# Patient Record
Sex: FEMALE
[De-identification: names, ages, dates, MRNs, and addresses within clinical notes are randomized per-mention and may not be internally consistent; named-entity substitution may affect disease eponyms.]

---

## 2021-01-16 ENCOUNTER — NURSE TRIAGE (OUTPATIENT)
Dept: OTHER | Facility: CLINIC | Age: 41
End: 2021-01-16

## 2021-01-17 NOTE — TELEPHONE ENCOUNTER
Reason for Disposition   Triager unable to answer question    Answer Assessment - Initial Assessment Questions  1. ONSET: \"When did it happen? \" If happened < 10 minutes ago, ask: \"Did you wash off the chemical?\" If not, give First Aid Advice immediately. Pt was cutting a hot pepper and the pepper oil  got on the hand and she has had burning for several hours. She has washed the area several times- used soap, alcohol, butter, vegetable oil, baking soda mixed with water- pt states this happened tonight around 8 pm      2. CHEMICAL: \"What is the name of the substance? \"  Hot pepper    3. LOCATION: \"Where is the burn located? \"      Left hand- between the pointer and index finger and the thumb    4. BURN SIZE: \"How large is the burn? \"  The palm is roughly 1% of the total body surface area (BSA). Not on the palm - just in between two fingers and the thumb    5. SEVERITY OF THE BURN: \"Is there redness? \", \"Are there any blisters? \"    Denies redness or blisters    6. MECHANISM: \"Tell me how it happened. \"     Cutting a hot pepper and some of the oils got on the skin    7. PAIN: [facility] Memory you having any pain? \" \"How bad is the pain? \" (Scale 1-10; or mild, moderate, severe)    - MILD (1-3): doesn't interfere with normal activities     - MODERATE (4-7): interferes with normal activities or awakens from sleep     - SEVERE (8-10): excruciating pain, unable to do any normal activities     3/10    8. OTHER SYMPTOMS: \"Do you have any other symptoms? \"  Just a burning sensation     9. PREGNANCY: \"Is there any chance you are pregnant? \" \"When was your last menstrual period? \"   Denies    Protocols used: BURNS - CHEMICAL-ADULT-AH    Brief description of triage: burning from hot pepper, between fingers and left thumb. See assessment. Triage indicates for patient to Call [facility]. Warm transfer to [address] [place] [address] pt to [facility]- care advise provided to pt per poison control to relieve the burning sensation. Care advice provided, patient verbalizes understanding; denies any other questions or concerns; instructed to call back for any new or worsening symptoms. This triage is a result of a call to [address] [place] [address]. Please do not respond to the triage nurse through this encounter. Any subsequent communication should be directly with the patient.

## 2022-10-15 ENCOUNTER — NURSE TRIAGE (OUTPATIENT)
Dept: OTHER | Facility: CLINIC | Age: 42
End: 2022-10-15

## 2022-10-15 NOTE — TELEPHONE ENCOUNTER
[place] caller. No triage. Reason for Disposition   Medication questions   [1] Caller has NON-URGENT medicine question about med that PCP prescribed AND [2] triager unable to answer question    Protocols used:  Information Only Call - No Triage-ADULT-, Medication Question Call-ADULT-

## 2023-01-27 PROBLEM — L70.0 SUPERFICIAL ACNE VULGARIS: Status: ACTIVE | Noted: 2023-01-27

## 2023-01-27 PROBLEM — J35.8 TONSILLITH: Status: ACTIVE | Noted: 2023-01-27

## 2023-01-27 PROBLEM — M54.2 NECK PAIN, ACUTE: Status: ACTIVE | Noted: 2023-01-27

## 2023-01-27 PROBLEM — H69.92 DYSFUNCTION OF LEFT EUSTACHIAN TUBE: Status: ACTIVE | Noted: 2023-01-27

## 2023-01-27 PROBLEM — S29.012A STRAIN OF THORACIC SPINE: Status: ACTIVE | Noted: 2023-01-27

## 2023-01-27 PROBLEM — M72.2 PLANTAR FASCIITIS: Status: ACTIVE | Noted: 2023-01-27

## 2023-01-27 PROBLEM — J01.00 ACUTE MAXILLARY SINUSITIS: Status: ACTIVE | Noted: 2023-01-27

## 2023-01-27 PROBLEM — J32.9 SINUSITIS: Status: ACTIVE | Noted: 2023-01-27

## 2023-01-27 PROBLEM — M79.673 FOOT PAIN: Status: ACTIVE | Noted: 2023-01-27

## 2023-01-27 PROBLEM — G89.29 CHRONIC FOOT PAIN: Status: ACTIVE | Noted: 2023-01-27

## 2023-01-27 PROBLEM — M84.376A STRESS FRACTURE OF FOOT: Status: ACTIVE | Noted: 2023-01-27

## 2023-01-27 PROBLEM — S16.1XXA CERVICAL STRAIN: Status: ACTIVE | Noted: 2023-01-27

## 2023-01-27 PROBLEM — J30.9 ALLERGIC RHINITIS: Status: ACTIVE | Noted: 2023-01-27

## 2023-01-27 PROBLEM — M21.619 ASYMPTOMATIC BUNION: Status: ACTIVE | Noted: 2023-01-27

## 2023-01-27 PROBLEM — R42 DIZZINESS: Status: ACTIVE | Noted: 2023-01-27

## 2023-01-27 PROBLEM — R10.32 ABDOMINAL PAIN, ACUTE, LEFT LOWER QUADRANT: Status: ACTIVE | Noted: 2023-01-27

## 2023-01-27 PROBLEM — M54.6 THORACOLUMBAR BACK PAIN: Status: ACTIVE | Noted: 2023-01-27

## 2023-01-27 PROBLEM — J02.9 PHARYNGITIS: Status: ACTIVE | Noted: 2023-01-27

## 2023-01-27 PROBLEM — M79.673 CHRONIC FOOT PAIN: Status: ACTIVE | Noted: 2023-01-27

## 2023-01-27 PROBLEM — S92.309A FRACTURE OF METATARSAL: Status: ACTIVE | Noted: 2023-01-27

## 2023-01-27 PROBLEM — M79.673 PAIN OF FOOT: Status: ACTIVE | Noted: 2023-01-27

## 2023-01-27 PROBLEM — E55.9 VITAMIN D DEFICIENCY: Status: ACTIVE | Noted: 2023-01-27

## 2023-01-27 PROBLEM — J06.9 VIRAL URI WITH COUGH: Status: ACTIVE | Noted: 2023-01-27

## 2023-01-27 PROBLEM — Z04.9 CONDITION NOT FOUND: Status: ACTIVE | Noted: 2023-01-27

## 2023-01-27 PROBLEM — S13.4XXA WHIPLASH INJURY TO NECK: Status: ACTIVE | Noted: 2023-01-27

## 2023-01-27 PROBLEM — F32.9 DEPRESSION, MAJOR: Status: ACTIVE | Noted: 2023-01-27

## 2023-01-27 PROBLEM — J02.9 SORE THROAT: Status: ACTIVE | Noted: 2023-01-27

## 2023-01-27 PROBLEM — M54.50 THORACOLUMBAR BACK PAIN: Status: ACTIVE | Noted: 2023-01-27

## 2023-01-27 PROBLEM — R93.89 ABNORMAL ULTRASOUND OF PELVIS: Status: ACTIVE | Noted: 2023-01-27

## 2023-01-27 PROBLEM — R35.0 INCREASED URINARY FREQUENCY: Status: ACTIVE | Noted: 2023-01-27

## 2023-01-27 PROBLEM — R53.83 FATIGUE: Status: ACTIVE | Noted: 2023-01-27

## 2023-01-27 RX ORDER — DAPSONE 50 MG/G
GEL TOPICAL
COMMUNITY
Start: 2015-03-02 | End: 2023-10-25 | Stop reason: SDUPTHER

## 2023-01-27 RX ORDER — FOLIC ACID/MULTIVIT,IRON,MINER 0.4MG-18MG
1 TABLET ORAL DAILY
COMMUNITY
Start: 2021-02-15

## 2023-01-27 RX ORDER — LORATADINE 10 MG/1
TABLET ORAL
COMMUNITY
Start: 2019-07-27

## 2023-01-27 RX ORDER — MOMETASONE FUROATE 50 UG/1
SPRAY, METERED NASAL
COMMUNITY

## 2023-01-27 RX ORDER — ACETAMINOPHEN 500 MG
TABLET ORAL
COMMUNITY
Start: 2022-07-25 | End: 2023-03-10 | Stop reason: SDUPTHER

## 2023-01-27 RX ORDER — SERTRALINE HYDROCHLORIDE 100 MG/1
1 TABLET, FILM COATED ORAL DAILY
COMMUNITY
Start: 2018-07-30 | End: 2023-07-20

## 2023-03-08 ASSESSMENT — PROMIS GLOBAL HEALTH SCALE
RATE_PHYSICAL_HEALTH: VERY GOOD
RATE_SOCIAL_SATISFACTION: VERY GOOD
CARRYOUT_SOCIAL_ACTIVITIES: VERY GOOD
RATE_AVERAGE_PAIN: 1
RATE_AVERAGE_FATIGUE: MILD
CARRYOUT_PHYSICAL_ACTIVITIES: COMPLETELY
RATE_MENTAL_HEALTH: VERY GOOD
EMOTIONAL_PROBLEMS: RARELY
RATE_QUALITY_OF_LIFE: VERY GOOD
RATE_GENERAL_HEALTH: VERY GOOD

## 2023-03-10 ENCOUNTER — OFFICE VISIT (OUTPATIENT)
Dept: PRIMARY CARE | Facility: CLINIC | Age: 43
End: 2023-03-10
Payer: COMMERCIAL

## 2023-03-10 VITALS
RESPIRATION RATE: 16 BRPM | HEART RATE: 74 BPM | HEIGHT: 62 IN | WEIGHT: 130 LBS | DIASTOLIC BLOOD PRESSURE: 78 MMHG | TEMPERATURE: 97.9 F | BODY MASS INDEX: 23.92 KG/M2 | SYSTOLIC BLOOD PRESSURE: 122 MMHG

## 2023-03-10 DIAGNOSIS — Z00.00 ENCOUNTER FOR PREVENTIVE HEALTH EXAMINATION: ICD-10-CM

## 2023-03-10 DIAGNOSIS — E55.9 VITAMIN D DEFICIENCY: Primary | ICD-10-CM

## 2023-03-10 PROBLEM — M54.2 NECK PAIN, ACUTE: Status: RESOLVED | Noted: 2023-01-27 | Resolved: 2023-03-10

## 2023-03-10 PROBLEM — H69.92 DYSFUNCTION OF LEFT EUSTACHIAN TUBE: Status: RESOLVED | Noted: 2023-01-27 | Resolved: 2023-03-10

## 2023-03-10 PROBLEM — M79.673 FOOT PAIN: Status: RESOLVED | Noted: 2023-01-27 | Resolved: 2023-03-10

## 2023-03-10 PROBLEM — J02.9 SORE THROAT: Status: RESOLVED | Noted: 2023-01-27 | Resolved: 2023-03-10

## 2023-03-10 PROBLEM — R42 DIZZINESS: Status: RESOLVED | Noted: 2023-01-27 | Resolved: 2023-03-10

## 2023-03-10 PROBLEM — R10.32 ABDOMINAL PAIN, ACUTE, LEFT LOWER QUADRANT: Status: RESOLVED | Noted: 2023-01-27 | Resolved: 2023-03-10

## 2023-03-10 PROBLEM — J02.9 PHARYNGITIS: Status: RESOLVED | Noted: 2023-01-27 | Resolved: 2023-03-10

## 2023-03-10 PROBLEM — M54.6 THORACOLUMBAR BACK PAIN: Status: RESOLVED | Noted: 2023-01-27 | Resolved: 2023-03-10

## 2023-03-10 PROBLEM — G89.29 CHRONIC FOOT PAIN: Status: RESOLVED | Noted: 2023-01-27 | Resolved: 2023-03-10

## 2023-03-10 PROBLEM — S13.4XXA WHIPLASH INJURY TO NECK: Status: RESOLVED | Noted: 2023-01-27 | Resolved: 2023-03-10

## 2023-03-10 PROBLEM — R53.83 FATIGUE: Status: RESOLVED | Noted: 2023-01-27 | Resolved: 2023-03-10

## 2023-03-10 PROBLEM — J35.8 TONSILLITH: Status: RESOLVED | Noted: 2023-01-27 | Resolved: 2023-03-10

## 2023-03-10 PROBLEM — S29.012A STRAIN OF THORACIC SPINE: Status: RESOLVED | Noted: 2023-01-27 | Resolved: 2023-03-10

## 2023-03-10 PROBLEM — J01.00 ACUTE MAXILLARY SINUSITIS: Status: RESOLVED | Noted: 2023-01-27 | Resolved: 2023-03-10

## 2023-03-10 PROBLEM — Z04.9 CONDITION NOT FOUND: Status: RESOLVED | Noted: 2023-01-27 | Resolved: 2023-03-10

## 2023-03-10 PROBLEM — S92.309A FRACTURE OF METATARSAL: Status: RESOLVED | Noted: 2023-01-27 | Resolved: 2023-03-10

## 2023-03-10 PROBLEM — J32.9 SINUSITIS: Status: RESOLVED | Noted: 2023-01-27 | Resolved: 2023-03-10

## 2023-03-10 PROBLEM — J06.9 VIRAL URI WITH COUGH: Status: RESOLVED | Noted: 2023-01-27 | Resolved: 2023-03-10

## 2023-03-10 PROBLEM — S16.1XXA CERVICAL STRAIN: Status: RESOLVED | Noted: 2023-01-27 | Resolved: 2023-03-10

## 2023-03-10 PROBLEM — M72.2 PLANTAR FASCIITIS: Status: RESOLVED | Noted: 2023-01-27 | Resolved: 2023-03-10

## 2023-03-10 PROBLEM — R35.0 INCREASED URINARY FREQUENCY: Status: RESOLVED | Noted: 2023-01-27 | Resolved: 2023-03-10

## 2023-03-10 PROBLEM — M54.50 THORACOLUMBAR BACK PAIN: Status: RESOLVED | Noted: 2023-01-27 | Resolved: 2023-03-10

## 2023-03-10 PROBLEM — M79.673 CHRONIC FOOT PAIN: Status: RESOLVED | Noted: 2023-01-27 | Resolved: 2023-03-10

## 2023-03-10 PROCEDURE — 99396 PREV VISIT EST AGE 40-64: CPT | Performed by: FAMILY MEDICINE

## 2023-03-10 PROCEDURE — 1036F TOBACCO NON-USER: CPT | Performed by: FAMILY MEDICINE

## 2023-03-10 RX ORDER — ACETAMINOPHEN 500 MG
5000 TABLET ORAL DAILY
Qty: 90 TABLET | Refills: 3 | Status: SHIPPED | OUTPATIENT
Start: 2023-03-10

## 2023-03-10 NOTE — PROGRESS NOTES
"Subjective   Patient ID: Neena Aviles is a 42 y.o. female who presents for Annual Exam.  Patient is here for a periodic health exam. We reviewed previous labs and medical records and history. I reviewed preventative health testing and immunizations.    She had a mammogram recently from her gynecologist.  She says she does not need Pap test anymore.  She has no complaints or concerns today and has been feeling well.        Objective   /78   Pulse 74   Temp 36.6 °C (97.9 °F)   Resp 16   Ht 1.575 m (5' 2\")   Wt 59 kg (130 lb)   BMI 23.78 kg/m²     Physical Exam  Vitals and nursing note reviewed.   Constitutional:       General: She is not in acute distress.     Appearance: Normal appearance.   HENT:      Head: Normocephalic and atraumatic.      Right Ear: Tympanic membrane, ear canal and external ear normal.      Left Ear: Tympanic membrane, ear canal and external ear normal.      Nose: Nose normal.      Mouth/Throat:      Mouth: Mucous membranes are moist.      Pharynx: Oropharynx is clear.   Eyes:      Extraocular Movements: Extraocular movements intact.      Conjunctiva/sclera: Conjunctivae normal.      Pupils: Pupils are equal, round, and reactive to light.   Cardiovascular:      Rate and Rhythm: Normal rate and regular rhythm.      Pulses: Normal pulses.      Heart sounds: Normal heart sounds. No murmur heard.     No friction rub. No gallop.   Pulmonary:      Effort: Pulmonary effort is normal. No respiratory distress.      Breath sounds: Normal breath sounds.   Abdominal:      General: Abdomen is flat. Bowel sounds are normal. There is no distension.      Palpations: Abdomen is soft.      Tenderness: There is no abdominal tenderness.   Musculoskeletal:         General: Normal range of motion.      Cervical back: Normal range of motion and neck supple.   Lymphadenopathy:      Cervical: No cervical adenopathy.   Skin:     General: Skin is warm and dry.      Findings: No lesion or rash. "   Neurological:      General: No focal deficit present.      Mental Status: She is alert. Mental status is at baseline.   Psychiatric:         Mood and Affect: Mood normal.         Behavior: Behavior normal.         Thought Content: Thought content normal.         Judgment: Judgment normal.         Assessment/Plan   Diagnoses and all orders for this visit:  Vitamin D deficiency  -     cholecalciferol (Vitamin D-3) 5,000 Units tablet; Take 1 tablet (5,000 Units) by mouth once daily.  Patient's recent lab was normal.  Encounter for preventive health examination   Recommend regular exercise, balanced diet, regular dental exams, and healthy habits.  Appropriate labs ordered or reviewed.  Her immunizations are up-to-date.  She had a mammogram recently.  I recommend a yearly flu shot in the fall and I recommend a yearly wellness exam.

## 2023-03-20 LAB
FLU A RESULT: NOT DETECTED
FLU B RESULT: NOT DETECTED
SARS-COV-2 RESULT: NOT DETECTED

## 2023-07-20 DIAGNOSIS — F33.9 EPISODE OF RECURRENT MAJOR DEPRESSIVE DISORDER, UNSPECIFIED DEPRESSION EPISODE SEVERITY (CMS-HCC): ICD-10-CM

## 2023-07-20 RX ORDER — SERTRALINE HYDROCHLORIDE 100 MG/1
100 TABLET, FILM COATED ORAL DAILY
Qty: 90 TABLET | Refills: 0 | Status: SHIPPED | OUTPATIENT
Start: 2023-07-20 | End: 2023-10-18 | Stop reason: SDUPTHER

## 2023-10-18 DIAGNOSIS — F33.9 EPISODE OF RECURRENT MAJOR DEPRESSIVE DISORDER, UNSPECIFIED DEPRESSION EPISODE SEVERITY (CMS-HCC): ICD-10-CM

## 2023-10-18 RX ORDER — SERTRALINE HYDROCHLORIDE 100 MG/1
100 TABLET, FILM COATED ORAL DAILY
Qty: 90 TABLET | Refills: 1 | OUTPATIENT
Start: 2023-10-18

## 2023-10-18 RX ORDER — SERTRALINE HYDROCHLORIDE 100 MG/1
100 TABLET, FILM COATED ORAL DAILY
Qty: 30 TABLET | Refills: 0 | Status: SHIPPED | OUTPATIENT
Start: 2023-10-18 | End: 2023-10-25 | Stop reason: SDUPTHER

## 2023-10-18 NOTE — TELEPHONE ENCOUNTER
Please call patient and schedule an appointment.  You can send in a 30-day prescription if needed so she does not run out.

## 2023-10-25 ENCOUNTER — OFFICE VISIT (OUTPATIENT)
Dept: PRIMARY CARE | Facility: CLINIC | Age: 43
End: 2023-10-25
Payer: COMMERCIAL

## 2023-10-25 VITALS
SYSTOLIC BLOOD PRESSURE: 114 MMHG | HEIGHT: 62 IN | WEIGHT: 128 LBS | DIASTOLIC BLOOD PRESSURE: 68 MMHG | TEMPERATURE: 97.1 F | RESPIRATION RATE: 16 BRPM | BODY MASS INDEX: 23.55 KG/M2 | HEART RATE: 62 BPM

## 2023-10-25 DIAGNOSIS — E55.9 VITAMIN D DEFICIENCY: ICD-10-CM

## 2023-10-25 DIAGNOSIS — F33.41 RECURRENT MAJOR DEPRESSIVE DISORDER, IN PARTIAL REMISSION (CMS-HCC): Primary | ICD-10-CM

## 2023-10-25 DIAGNOSIS — L70.0 SUPERFICIAL ACNE VULGARIS: ICD-10-CM

## 2023-10-25 PROBLEM — B34.9 VIRAL SYNDROME: Status: RESOLVED | Noted: 2023-10-25 | Resolved: 2023-10-25

## 2023-10-25 PROBLEM — H10.32 ACUTE CONJUNCTIVITIS, LEFT EYE: Status: ACTIVE | Noted: 2023-10-25

## 2023-10-25 PROBLEM — Z90.710 HISTORY OF HYSTERECTOMY: Status: ACTIVE | Noted: 2023-10-25

## 2023-10-25 PROBLEM — B34.9 VIRAL SYNDROME: Status: ACTIVE | Noted: 2023-10-25

## 2023-10-25 PROBLEM — H10.32 ACUTE CONJUNCTIVITIS, LEFT EYE: Status: RESOLVED | Noted: 2023-10-25 | Resolved: 2023-10-25

## 2023-10-25 PROCEDURE — 1036F TOBACCO NON-USER: CPT | Performed by: FAMILY MEDICINE

## 2023-10-25 PROCEDURE — 99214 OFFICE O/P EST MOD 30 MIN: CPT | Performed by: FAMILY MEDICINE

## 2023-10-25 RX ORDER — DAPSONE 50 MG/G
GEL TOPICAL
Qty: 90 G | Refills: 0 | Status: SHIPPED | OUTPATIENT
Start: 2023-10-25

## 2023-10-25 RX ORDER — SERTRALINE HYDROCHLORIDE 100 MG/1
100 TABLET, FILM COATED ORAL DAILY
Qty: 90 TABLET | Refills: 1 | Status: SHIPPED | OUTPATIENT
Start: 2023-10-25 | End: 2024-03-18 | Stop reason: SDUPTHER

## 2023-10-25 NOTE — PROGRESS NOTES
Neena Aviles is a 43 y.o. female here today for   Chief Complaint   Patient presents with    Depression    Vitamin D Deficiency        Mood disorder recheck -- Patient feels like condition is well controlled.  Has good control of mood and emotional reactions.  No SE's or problems with medications.  No homicidal or suicidal ideation.  Patient wishes to continue same medications.   Takes sertraline daily and sees a counselor monthly and seems very stable now.  Takes sertraline in AM.  Still with some sexual SE's with decreased libido and decreased ability to orgasm but not new and now bothersome.      Vitamin D deficiency -- Patient reports no muscle weakness or pain.  Taking Vitamin D as instructed.  Takes 5000 U daily.      Acne - Patient reports that this condition has been stable.  There has not been any worsening or change in symptoms.  Patient feels it is well controlled and would like to continue current therapies.         Current Outpatient Medications:     cholecalciferol (Vitamin D-3) 5,000 Units tablet, Take 1 tablet (5,000 Units) by mouth once daily., Disp: 90 tablet, Rfl: 3    loratadine (Claritin) 10 mg tablet, Take by mouth., Disp: , Rfl:     mometasone (Nasonex) 50 mcg/actuation nasal spray, Nasonex 50 MCG/ACT SUSP  Refills: 0     Active, Disp: , Rfl:     multivitamin-min-iron-FA-vit K (One Daily Women's) 18 mg iron-400 mcg-25 mcg tablet, Take 1 tablet by mouth once daily., Disp: , Rfl:     dapsone (Aczone) 5 % gel, Apply twice daily to face for acne, Disp: 90 g, Rfl: 0    sertraline (Zoloft) 100 mg tablet, Take 1 tablet (100 mg) by mouth once daily., Disp: 90 tablet, Rfl: 1    Patient Active Problem List   Diagnosis    Abnormal ultrasound of pelvis    Allergic rhinitis    Asymptomatic bunion    Recurrent major depressive disorder, in partial remission (CMS/HCC)    Stress fracture of foot    Superficial acne vulgaris    Vitamin D deficiency    Encounter for preventive health examination     "History of hysterectomy         No results found for this or any previous visit (from the past 672 hour(s)).     Objective    Visit Vitals  /68   Pulse 62   Temp 36.2 °C (97.1 °F)   Resp 16   Ht 1.575 m (5' 2\")   Wt 58.1 kg (128 lb)   BMI 23.41 kg/m²     Body mass index is 23.41 kg/m².     Physical Exam   General - Not in acute distress and cooperative.  Build & Nutrition - Well developed  Posture - Normal  Gait - Normal  Mental Status - alert and oriented x 3    Head - Normocephalic    Eyes - Bilateral - Sclera clear and lids pink without edema or mass.      Skin - Warm and dry with no rashes on visible skin    Neuropsychiatric - normal mood and affect, well groomed and good eye contact.  Able to articulate well with normal speech/language, rate and coherence.  Associations are intact.  No evidence of hallucinations, delusions, obsessions or homicidal/suicidal ideation.  Attention span and ability to concentrate are normal.     Assessment    1. Recurrent major depressive disorder, in partial remission (CMS/HCC)  sertraline (Zoloft) 100 mg tablet   Condition well controlled.  No change in current treatment regimen.  Refill given of current medication.  Make a follow up appointment with me for recheck in 6 months.      2. Vitamin D deficiency     Condition well controlled.  No change in current treatment regimen.       3. Superficial acne vulgaris  dapsone (Aczone) 5 % gel   Condition well controlled.  No change in current treatment regimen.  Refill given of current medication.  Make a follow up appointment with me for recheck in 6 months.            "

## 2024-03-18 ENCOUNTER — OFFICE VISIT (OUTPATIENT)
Dept: PRIMARY CARE | Facility: CLINIC | Age: 44
End: 2024-03-18
Payer: COMMERCIAL

## 2024-03-18 ENCOUNTER — LAB (OUTPATIENT)
Dept: LAB | Facility: LAB | Age: 44
End: 2024-03-18
Payer: COMMERCIAL

## 2024-03-18 VITALS
DIASTOLIC BLOOD PRESSURE: 68 MMHG | HEIGHT: 62 IN | TEMPERATURE: 97 F | RESPIRATION RATE: 16 BRPM | SYSTOLIC BLOOD PRESSURE: 122 MMHG | HEART RATE: 82 BPM | WEIGHT: 128 LBS | BODY MASS INDEX: 23.55 KG/M2

## 2024-03-18 DIAGNOSIS — Z00.00 ENCOUNTER FOR PREVENTIVE HEALTH EXAMINATION: ICD-10-CM

## 2024-03-18 DIAGNOSIS — E55.9 VITAMIN D DEFICIENCY: ICD-10-CM

## 2024-03-18 DIAGNOSIS — F33.41 RECURRENT MAJOR DEPRESSIVE DISORDER, IN PARTIAL REMISSION (CMS-HCC): ICD-10-CM

## 2024-03-18 DIAGNOSIS — Z00.00 ENCOUNTER FOR PREVENTIVE HEALTH EXAMINATION: Primary | ICD-10-CM

## 2024-03-18 LAB
25(OH)D3 SERPL-MCNC: 68 NG/ML (ref 30–100)
ALBUMIN SERPL BCP-MCNC: 4.2 G/DL (ref 3.4–5)
ALP SERPL-CCNC: 53 U/L (ref 33–110)
ALT SERPL W P-5'-P-CCNC: 13 U/L (ref 7–45)
ANION GAP SERPL CALC-SCNC: 8 MMOL/L (ref 10–20)
AST SERPL W P-5'-P-CCNC: 14 U/L (ref 9–39)
BILIRUB SERPL-MCNC: 0.4 MG/DL (ref 0–1.2)
BUN SERPL-MCNC: 14 MG/DL (ref 6–23)
CALCIUM SERPL-MCNC: 9.2 MG/DL (ref 8.6–10.3)
CHLORIDE SERPL-SCNC: 105 MMOL/L (ref 98–107)
CHOLEST SERPL-MCNC: 159 MG/DL (ref 0–199)
CHOLESTEROL/HDL RATIO: 3.1
CO2 SERPL-SCNC: 31 MMOL/L (ref 21–32)
CREAT SERPL-MCNC: 0.78 MG/DL (ref 0.5–1.05)
EGFRCR SERPLBLD CKD-EPI 2021: >90 ML/MIN/1.73M*2
ERYTHROCYTE [DISTWIDTH] IN BLOOD BY AUTOMATED COUNT: 14.3 % (ref 11.5–14.5)
GLUCOSE SERPL-MCNC: 97 MG/DL (ref 74–99)
HCT VFR BLD AUTO: 43.4 % (ref 36–46)
HCV AB SER QL: NONREACTIVE
HDLC SERPL-MCNC: 51.2 MG/DL
HGB BLD-MCNC: 14.3 G/DL (ref 12–16)
HIV 1+2 AB+HIV1 P24 AG SERPL QL IA: NONREACTIVE
LDLC SERPL CALC-MCNC: 91 MG/DL
MCH RBC QN AUTO: 28.1 PG (ref 26–34)
MCHC RBC AUTO-ENTMCNC: 32.9 G/DL (ref 32–36)
MCV RBC AUTO: 85 FL (ref 80–100)
NON HDL CHOLESTEROL: 108 MG/DL (ref 0–149)
NRBC BLD-RTO: 0 /100 WBCS (ref 0–0)
PLATELET # BLD AUTO: 180 X10*3/UL (ref 150–450)
POTASSIUM SERPL-SCNC: 4.4 MMOL/L (ref 3.5–5.3)
PROT SERPL-MCNC: 7 G/DL (ref 6.4–8.2)
RBC # BLD AUTO: 5.08 X10*6/UL (ref 4–5.2)
SODIUM SERPL-SCNC: 140 MMOL/L (ref 136–145)
TRIGL SERPL-MCNC: 86 MG/DL (ref 0–149)
TSH SERPL-ACNC: 1.5 MIU/L (ref 0.44–3.98)
VLDL: 17 MG/DL (ref 0–40)
WBC # BLD AUTO: 5.8 X10*3/UL (ref 4.4–11.3)

## 2024-03-18 PROCEDURE — 1036F TOBACCO NON-USER: CPT | Performed by: FAMILY MEDICINE

## 2024-03-18 PROCEDURE — 99396 PREV VISIT EST AGE 40-64: CPT | Performed by: FAMILY MEDICINE

## 2024-03-18 PROCEDURE — 36415 COLL VENOUS BLD VENIPUNCTURE: CPT

## 2024-03-18 PROCEDURE — 99212 OFFICE O/P EST SF 10 MIN: CPT | Performed by: FAMILY MEDICINE

## 2024-03-18 PROCEDURE — 86803 HEPATITIS C AB TEST: CPT

## 2024-03-18 PROCEDURE — 87389 HIV-1 AG W/HIV-1&-2 AB AG IA: CPT

## 2024-03-18 PROCEDURE — 85027 COMPLETE CBC AUTOMATED: CPT

## 2024-03-18 PROCEDURE — 80061 LIPID PANEL: CPT

## 2024-03-18 PROCEDURE — 84443 ASSAY THYROID STIM HORMONE: CPT

## 2024-03-18 PROCEDURE — 80053 COMPREHEN METABOLIC PANEL: CPT

## 2024-03-18 PROCEDURE — 82306 VITAMIN D 25 HYDROXY: CPT

## 2024-03-18 RX ORDER — SERTRALINE HYDROCHLORIDE 100 MG/1
100 TABLET, FILM COATED ORAL DAILY
Qty: 90 TABLET | Refills: 1 | Status: SHIPPED | OUTPATIENT
Start: 2024-03-18

## 2024-03-18 ASSESSMENT — ENCOUNTER SYMPTOMS: DEPRESSION: 1

## 2024-03-18 NOTE — PROGRESS NOTES
Neena Aviles is a 44 y.o. female here today for   Chief Complaint   Patient presents with    Annual Exam    Depression        Depression  Visit Type: follow-up    Mood disorder recheck -- Patient feels like condition is well controlled.  Has good control of mood and emotional reactions.  No SE's or problems with medications.  No homicidal or suicidal ideation.  Patient wishes to continue same medications.  Sees counselor regularly and it helps.      Patient is here for a periodic health exam. We reviewed previous labs and medical records and history. I reviewed preventative health testing and immunizations.  Sees gyn regularly and she does mammograms and exams.    Vitamin D deficiency -- Patient reports no muscle weakness or pain.  Taking Vitamin D as instructed. Taking 5000 U daily.      Exercising more.  Got 5th Covid booster in October - got Covid in November on a cruise.      [facility] - OTC meds prn control it well.        Current Outpatient Medications:     cholecalciferol (Vitamin D-3) 5,000 Units tablet, Take 1 tablet (5,000 Units) by mouth once daily., Disp: 90 tablet, Rfl: 3    dapsone (Aczone) 5 % gel, Apply twice daily to face for acne, Disp: 90 g, Rfl: 0    loratadine (Claritin) 10 mg tablet, Take by mouth., Disp: , Rfl:     mometasone (Nasonex) 50 mcg/actuation nasal spray, Nasonex 50 MCG/ACT SUSP  Refills: 0     Active, Disp: , Rfl:     multivitamin-min-iron-FA-vit K (One Daily Women's) 18 mg iron-400 mcg-25 mcg tablet, Take 1 tablet by mouth once daily., Disp: , Rfl:     sertraline (Zoloft) 100 mg tablet, Take 1 tablet (100 mg) by mouth once daily., Disp: 90 tablet, Rfl: 1    Patient Active Problem List   Diagnosis    Abnormal ultrasound of pelvis    Allergic rhinitis    Asymptomatic bunion    Recurrent major depressive disorder, in partial remission (CMS/HCC)    Stress fracture of foot    Superficial acne vulgaris    Vitamin D deficiency    Encounter for preventive health examination    History of  "hysterectomy         No results found for this or any previous visit (from the past 672 hour(s)).     Objective    Visit Vitals    Visit Vitals  /68   Pulse 82   Temp 36.1 °C (97 °F)   Resp 16   Ht 1.575 m (5' 2\")   Wt 58.1 kg (128 lb)   BMI 23.41 kg/m²   Smoking Status Never   BSA 1.59 m²       Body mass index is 23.41 kg/m².     Physical Exam  Vitals and nursing note reviewed.   Constitutional:       General: She is not in acute distress.     Appearance: Normal appearance.   HENT:      Head: Normocephalic and atraumatic.      Right Ear: Tympanic membrane, ear canal and external ear normal.      Left Ear: Tympanic membrane, ear canal and external ear normal.      Nose: Nose normal.      Mouth/Throat:      Mouth: Mucous membranes are moist.      Pharynx: Oropharynx is clear.   Eyes:      Extraocular Movements: Extraocular movements intact.      Conjunctiva/sclera: Conjunctivae normal.      Pupils: Pupils are equal, round, and reactive to light.   Cardiovascular:      Rate and Rhythm: Normal rate and regular rhythm.      Pulses: Normal pulses.      Heart sounds: Normal heart sounds. No murmur heard.     No friction rub. No gallop.   Pulmonary:      Effort: Pulmonary effort is normal. No respiratory distress.      Breath sounds: Normal breath sounds.   Abdominal:      General: Abdomen is flat. Bowel sounds are normal. There is no distension.      Palpations: Abdomen is soft.      Tenderness: There is no abdominal tenderness.   Musculoskeletal:         General: Normal range of motion.      Cervical back: Normal range of motion and neck supple.   Lymphadenopathy:      Cervical: No cervical adenopathy.   Skin:     General: Skin is warm and dry.      Findings: No lesion or rash.   Neurological:      General: No focal deficit present.      Mental Status: She is alert. Mental status is at baseline.   Psychiatric:         Mood and Affect: Mood normal.         Behavior: Behavior normal.         Thought Content: Thought " content normal.         Judgment: Judgment normal.            Assessment    1. Encounter for preventive health examination  Comprehensive Metabolic Panel, CBC, Lipid Panel, Vitamin D 25-Hydroxy,Total (for eval of Vitamin D levels), TSH with reflex to Free T4 if abnormal, Hepatitis C Antibody, HIV 1/2 Antigen/Antibody Screen with Reflex to Confirmation   Recommend regular exercise, balanced diet, regular dental exams, and healthy habits.  Appropriate labs ordered or reviewed.  I recommend to eat plenty of plant foods (such as whole-grain products, fruits, and vegetables) and a moderate amount of lean and low-fat, animal-based food (meat and dairy products).  When shopping, choose lean meats, fish, and poultry. I recommend to increase aerobic exercise.  She does see her gynecologist regularly for female exams and mammograms.  I recommend a yearly flu shot in the fall and I recommend a yearly wellness exam.         2. Recurrent major depressive disorder, in partial remission (CMS/HCC)  sertraline (Zoloft) 100 mg tablet   Condition well controlled.  No change in current treatment regimen.  Refill given of current medication.  Make a follow up appointment with me for recheck in 6 months.       3. Vitamin D deficiency  Vitamin D 25-Hydroxy,Total (for eval of Vitamin D levels)   Appropriate labs ordered or reviewed.         Orders Placed This Encounter      sertraline (Zoloft) 100 mg tablet       Orders Placed This Encounter   Procedures    Comprehensive Metabolic Panel    CBC    Lipid Panel    Vitamin D 25-Hydroxy,Total (for eval of Vitamin D levels)    TSH with reflex to Free T4 if abnormal    Hepatitis C Antibody    HIV 1/2 Antigen/Antibody Screen with Reflex to Confirmation        New Medications Ordered This Visit   Medications    sertraline (Zoloft) 100 mg tablet     Sig: Take 1 tablet (100 mg) by mouth once daily.     Dispense:  90 tablet     Refill:  1

## 2024-03-27 ENCOUNTER — OFFICE VISIT (OUTPATIENT)
Dept: PRIMARY CARE | Facility: CLINIC | Age: 44
End: 2024-03-27
Payer: COMMERCIAL

## 2024-03-27 VITALS
RESPIRATION RATE: 16 BRPM | WEIGHT: 129 LBS | HEART RATE: 78 BPM | DIASTOLIC BLOOD PRESSURE: 70 MMHG | BODY MASS INDEX: 23.74 KG/M2 | HEIGHT: 62 IN | TEMPERATURE: 97.2 F | SYSTOLIC BLOOD PRESSURE: 118 MMHG

## 2024-03-27 DIAGNOSIS — J02.9 VIRAL PHARYNGITIS: Primary | ICD-10-CM

## 2024-03-27 PROBLEM — F32.9 MAJOR DEPRESSIVE DISORDER: Status: ACTIVE | Noted: 2023-01-27

## 2024-03-27 LAB — POC RAPID STREP: NEGATIVE

## 2024-03-27 PROCEDURE — 99213 OFFICE O/P EST LOW 20 MIN: CPT | Performed by: FAMILY MEDICINE

## 2024-03-27 PROCEDURE — 1036F TOBACCO NON-USER: CPT | Performed by: FAMILY MEDICINE

## 2024-03-27 PROCEDURE — 87880 STREP A ASSAY W/OPTIC: CPT | Performed by: FAMILY MEDICINE

## 2024-03-27 NOTE — PROGRESS NOTES
Neena Aviles is a 44 y.o. female here today for   Chief Complaint   Patient presents with    Sore Throat    Fatigue        HPI     ST and fatigue since 3 days ago.  Minor diarrhea.  No fever.  No Covid test at home.  RST negative.  No cough or ear pain.  No N/V.  No one else sick at home with similar symptoms.  No known exposure to COVID or influenza.    Current Outpatient Medications:     cholecalciferol (Vitamin D-3) 5,000 Units tablet, Take 1 tablet (5,000 Units) by mouth once daily., Disp: 90 tablet, Rfl: 3    dapsone (Aczone) 5 % gel, Apply twice daily to face for acne, Disp: 90 g, Rfl: 0    loratadine (Claritin) 10 mg tablet, Take by mouth., Disp: , Rfl:     mometasone (Nasonex) 50 mcg/actuation nasal spray, Nasonex 50 MCG/ACT SUSP  Refills: 0     Active, Disp: , Rfl:     multivitamin-min-iron-FA-vit K (One Daily Women's) 18 mg iron-400 mcg-25 mcg tablet, Take 1 tablet by mouth once daily., Disp: , Rfl:     sertraline (Zoloft) 100 mg tablet, Take 1 tablet (100 mg) by mouth once daily., Disp: 90 tablet, Rfl: 1    Patient Active Problem List   Diagnosis    Abnormal ultrasound of pelvis    Allergic rhinitis    Asymptomatic bunion    Recurrent major depressive disorder, in partial remission (CMS/HCC)    Stress fracture of foot    Superficial acne vulgaris    Vitamin D deficiency    Encounter for preventive health examination    History of hysterectomy    Major depressive disorder         Recent Results (from the past 672 hour(s))   Comprehensive Metabolic Panel    Collection Time: 03/18/24 10:35 AM   Result Value Ref Range    Glucose 97 74 - 99 mg/dL    Sodium 140 136 - 145 mmol/L    Potassium 4.4 3.5 - 5.3 mmol/L    Chloride 105 98 - 107 mmol/L    Bicarbonate 31 21 - 32 mmol/L    Anion Gap 8 (L) 10 - 20 mmol/L    Urea Nitrogen 14 6 - 23 mg/dL    Creatinine 0.78 0.50 - 1.05 mg/dL    eGFR >90 >60 mL/min/1.73m*2    Calcium 9.2 8.6 - 10.3 mg/dL    Albumin 4.2 3.4 - 5.0 g/dL    Alkaline Phosphatase 53 33 - 110  "U/L    Total Protein 7.0 6.4 - 8.2 g/dL    AST 14 9 - 39 U/L    Bilirubin, Total 0.4 0.0 - 1.2 mg/dL    ALT 13 7 - 45 U/L   CBC    Collection Time: 03/18/24 10:35 AM   Result Value Ref Range    WBC 5.8 4.4 - 11.3 x10*3/uL    nRBC 0.0 0.0 - 0.0 /100 WBCs    RBC 5.08 4.00 - 5.20 x10*6/uL    Hemoglobin 14.3 12.0 - 16.0 g/dL    Hematocrit 43.4 36.0 - 46.0 %    MCV 85 80 - 100 fL    MCH 28.1 26.0 - 34.0 pg    MCHC 32.9 32.0 - 36.0 g/dL    RDW 14.3 11.5 - 14.5 %    Platelets 180 150 - 450 x10*3/uL   Lipid Panel    Collection Time: 03/18/24 10:35 AM   Result Value Ref Range    Cholesterol 159 0 - 199 mg/dL    HDL-Cholesterol 51.2 mg/dL    Cholesterol/HDL Ratio 3.1     LDL Calculated 91 <=99 mg/dL    VLDL 17 0 - 40 mg/dL    Triglycerides 86 0 - 149 mg/dL    Non HDL Cholesterol 108 0 - 149 mg/dL   Vitamin D 25-Hydroxy,Total (for eval of Vitamin D levels)    Collection Time: 03/18/24 10:35 AM   Result Value Ref Range    Vitamin D, 25-Hydroxy, Total 68 30 - 100 ng/mL   TSH with reflex to Free T4 if abnormal    Collection Time: 03/18/24 10:35 AM   Result Value Ref Range    Thyroid Stimulating Hormone 1.50 0.44 - 3.98 mIU/L   Hepatitis C Antibody    Collection Time: 03/18/24 10:35 AM   Result Value Ref Range    Hepatitis C AB Nonreactive Nonreactive   HIV 1/2 Antigen/Antibody Screen with Reflex to Confirmation    Collection Time: 03/18/24 10:35 AM   Result Value Ref Range    HIV 1/2 Antigen/Antibody Screen with Reflex to Confirmation Nonreactive Nonreactive   POCT Rapid Strep A manually resulted    Collection Time: 03/27/24 10:01 AM   Result Value Ref Range    POC Rapid Strep Negative Negative        Objective    Visit Vitals    Visit Vitals  /70   Pulse 78   Temp 36.2 °C (97.2 °F)   Resp 16   Ht 1.575 m (5' 2\")   Wt 58.5 kg (129 lb)   BMI 23.59 kg/m²   Smoking Status Never   BSA 1.6 m²       Body mass index is 23.59 kg/m².     Physical Exam     General -  Cooperative, Not in acute distress.    Skin - Warm and dry with no " rashes.    Eye - Bilateral - pupils equal and round, sclera clear and lids pink without edema or mass.  Sclera and conjunctiva - bilateral - Normal.    ENMT - Left -TM pearly grey with good light reflex and externa auditory canal pink and dry.              Right -TM pearly grey with good light relflex and external auditory canal pink and dry.    Oropharynx - moist and pink, tonsils normal, no erythema noted.    Nose  - Nasal mucosa - no purulent discharge.    Sinuses -- Frontal - no tenderness observed.  Maxillary - no tenderness observed.  Ethmoid - no tenderness observed.    Lungs - Clear to auscultation and normal breathing effort.  Even and easy respiratory effort with no use of accessory muscles.    Heart -- RRR and no murmurs, rubs or thrill    Lymphatic - Cervical nodes normal with no enlargement.  Assessment    1. Viral pharyngitis  POCT Rapid Strep A manually resulted   Her rapid strep test was negative today.  I recommend that she do a COVID test at home and call us if the results are positive.  She likely has viral pharyngitis.  Call or RTO if symptoms worsen or don't fully resolve.  Increase fluid intake.  Rest.  May use OTC symptomatic medications as needed at the appropriate dose.               Orders Placed This Encounter   Procedures    POCT Rapid Strep A manually resulted        No orders of the defined types were placed in this encounter.

## 2024-07-23 DIAGNOSIS — L70.0 SUPERFICIAL ACNE VULGARIS: ICD-10-CM

## 2024-07-23 RX ORDER — DAPSONE 50 MG/G
GEL TOPICAL
Qty: 90 G | Refills: 7 | Status: SHIPPED | OUTPATIENT
Start: 2024-07-23

## 2024-08-24 ENCOUNTER — OFFICE VISIT (OUTPATIENT)
Dept: PRIMARY CARE | Facility: CLINIC | Age: 44
End: 2024-08-24
Payer: COMMERCIAL

## 2024-08-24 VITALS
TEMPERATURE: 98.3 F | WEIGHT: 127.4 LBS | HEART RATE: 105 BPM | DIASTOLIC BLOOD PRESSURE: 69 MMHG | RESPIRATION RATE: 16 BRPM | BODY MASS INDEX: 23.45 KG/M2 | SYSTOLIC BLOOD PRESSURE: 107 MMHG | OXYGEN SATURATION: 98 % | HEIGHT: 62 IN

## 2024-08-24 DIAGNOSIS — J32.0 LEFT MAXILLARY SINUSITIS: Primary | ICD-10-CM

## 2024-08-24 DIAGNOSIS — R05.9 COUGH IN ADULT PATIENT: ICD-10-CM

## 2024-08-24 DIAGNOSIS — R09.81 NASAL CONGESTION WITH RHINORRHEA: ICD-10-CM

## 2024-08-24 DIAGNOSIS — J34.89 NASAL CONGESTION WITH RHINORRHEA: ICD-10-CM

## 2024-08-24 DIAGNOSIS — J00 NASOPHARYNGITIS: ICD-10-CM

## 2024-08-24 PROCEDURE — 99213 OFFICE O/P EST LOW 20 MIN: CPT | Performed by: NURSE PRACTITIONER

## 2024-08-24 RX ORDER — AMOXICILLIN 875 MG/1
875 TABLET, FILM COATED ORAL 2 TIMES DAILY
Qty: 20 TABLET | Refills: 0 | Status: SHIPPED | OUTPATIENT
Start: 2024-08-24 | End: 2024-09-03

## 2024-08-24 RX ORDER — BROMPHENIRAMINE MALEATE, PSEUDOEPHEDRINE HYDROCHLORIDE, AND DEXTROMETHORPHAN HYDROBROMIDE 2; 30; 10 MG/5ML; MG/5ML; MG/5ML
5 SYRUP ORAL 4 TIMES DAILY PRN
Qty: 120 ML | Refills: 1 | Status: SHIPPED | OUTPATIENT
Start: 2024-08-24 | End: 2024-09-03

## 2024-08-24 ASSESSMENT — COLUMBIA-SUICIDE SEVERITY RATING SCALE - C-SSRS: 2. HAVE YOU ACTUALLY HAD ANY THOUGHTS OF KILLING YOURSELF?: NO

## 2024-08-24 ASSESSMENT — ENCOUNTER SYMPTOMS
DEPRESSION: 0
LOSS OF SENSATION IN FEET: 0
OCCASIONAL FEELINGS OF UNSTEADINESS: 0

## 2024-08-24 ASSESSMENT — PATIENT HEALTH QUESTIONNAIRE - PHQ9
SUM OF ALL RESPONSES TO PHQ9 QUESTIONS 1 AND 2: 0
2. FEELING DOWN, DEPRESSED OR HOPELESS: NOT AT ALL
SUM OF ALL RESPONSES TO PHQ9 QUESTIONS 1 AND 2: 0
1. LITTLE INTEREST OR PLEASURE IN DOING THINGS: NOT AT ALL
2. FEELING DOWN, DEPRESSED OR HOPELESS: NOT AT ALL
1. LITTLE INTEREST OR PLEASURE IN DOING THINGS: NOT AT ALL

## 2024-08-24 NOTE — PROGRESS NOTES
"Subjective   Patient ID: Neena Aviles is a 44 y.o. female who presents for Sinusitis and URI.    Symptoms: Cough, sinus pressure, congestion, allergies, stuffy nose,post nasal drip, sore throat, trouble swallowing.  Length of symptoms: since  5 days ago  OTC: Claritin with no help.  Related information:     HPI     Review of Systems    Objective   /69 Comment: auto  Pulse 105   Temp 36.8 °C (98.3 °F)   Resp 16   Ht 1.575 m (5' 2\")   Wt 57.8 kg (127 lb 6.4 oz)   SpO2 98%   BMI 23.30 kg/m²     Physical Exam    Assessment/Plan          "

## 2024-08-24 NOTE — PROGRESS NOTES
Subjective   Patient ID: Neena Aviles is a 44 y.o. female who is with a chief complaint of symptoms of respiratory tract infection.     HPI  Patient is a 44 y.o. female who CONSULTED AT [facility] today. Patient is with complaints of nasal congestion, mild nasal discharge, headache, left maxillary sinus pain, throat irritation, post nasal drip, productive cough, and mild fatigue. She denies having any muscle ache, loss of sense of taste, loss of sense of smell, diarrhea, chills nor fever. Patient states that present condition started about 6 days ago after being exposed to some symptomatic people during her recent trip to [place]. she denies shortness of breath, chest pain, palpitations, nor edema. she stated that she  tried OTC medications which afforded only slight relief of symptoms. she denies nausea, vomiting, abdominal pain, nor any other symptoms.    Patient states she had her COVID vaccine.  Patient states she had the flu shot for this season.    Patient states she underwent testing for COVID about 3 days ago, and the result was NEGATIVE.    Review of Systems  General: no weight loss, generally healthy, (+) mild fatigue  Head: (+) headache, (+) left maxillary sinus pain, no vertigo, no injury  Eyes: no diplopia, no tearing, no pain,   Ears: no change in hearing, no tinnitus, no bleeding, no vertigo  Mouth:  no dental difficulties, no gingival bleeding, (+) throat irritation, no loss of sense of taste, (+) post nasal drip,   Nose: (+) congestion, (+) mild discharge, no bleeding, no obstruction, no loss of sense of smell  Neck: no stiffness, no pain, no tenderness, no masses, no bruit  Pulmonary: no dyspnea, no wheezing, no hemoptysis, (+) productive cough  Cardiovascular: no chest pain, no palpitations, no syncope, no orthopnea  Gastrointestinal: no change in appetite, no dysphagia, no abdominal pains, no diarrhea, no emesis, no melena  Genito Urinary: no dysuria, no urinary  urgency, no nocturia, no incontinence, no change in nature of urine  Musculoskeletal: no muscle ache, no joint pain, no limitation of range of motion, no paresthesia, no numbness  Constitutional: no fever, no chills, no night sweats    Objective   Physical Exam  General: ambulatory, in no acute distress  Head: normocephalic, no lesions, (+) left maxillary sinus tenderness  Eyes: pink palpebral conjunctiva, anicteric sclerae, PERRLA, EOM's full  Ears: clear external auditory canals, no ear discharge, no bleeding from the ears, tympanic membrane intact  Nose: (+) congested nasal mucosa, (+) yellow mucoid nasal discharge, no bleeding, no obstruction  Throat: (+) erythema, and (+) exudate on posterior pharyngeal wall, no lesion  Neck: supple, no masses, no bruits, no CLADP  Chest: symmetrical chest expansion, no lagging, no retractions, clear breath sounds, no rales, no wheezes    Assessment/Plan   Problem List Items Addressed This Visit    None  Visit Diagnoses         Codes    Left maxillary sinusitis    -  Primary J32.0    Relevant Medications    amoxicillin (Amoxil) 875 mg tablet    brompheniramine-pseudoeph-DM 2-30-10 mg/5 mL syrup    Cough in adult patient     R05.9    Relevant Medications    amoxicillin (Amoxil) 875 mg tablet    brompheniramine-pseudoeph-DM 2-30-10 mg/5 mL syrup    Nasal congestion with rhinorrhea     R09.81, J34.89    Relevant Medications    amoxicillin (Amoxil) 875 mg tablet    brompheniramine-pseudoeph-DM 2-30-10 mg/5 mL syrup    Nasopharyngitis     J00    Relevant Medications    amoxicillin (Amoxil) 875 mg tablet    brompheniramine-pseudoeph-DM 2-30-10 mg/5 mL syrup        DISCHARGE SUMMARY:   Patient was seen and examined. Diagnosis, treatment, treatment options, and possible complications of today's illness discussed and explained to patient. Patient to take medication/s associated with this visit. Patient may also take OTC analgesic/antipyretic if needed for pain/fever. Advised to increase  oral fluid intake. Advised steam inhalation if needed to relieve congestion. Advised warm saline gargle if needed to relieve throat discomfort. Advised Listerine antiseptic mouthwash gargle TID. Patient may use Cepacol oral spray as needed to relieve throat discomfort. Patient was advised to discard the old toothbrush and use a new toothbrush beginning on the third of antibiotics. Advised to come back if with worsening or persistent symptoms. Patient verbalized understanding of plan of care.    Patient to come back in 7 - 10 days if needed for worsening symptoms.             AVILA Puente-CNP 08/24/24 9:52 AM

## 2024-09-23 ENCOUNTER — APPOINTMENT (OUTPATIENT)
Dept: PRIMARY CARE | Facility: CLINIC | Age: 44
End: 2024-09-23
Payer: COMMERCIAL

## 2024-09-23 VITALS
HEART RATE: 79 BPM | RESPIRATION RATE: 16 BRPM | SYSTOLIC BLOOD PRESSURE: 118 MMHG | BODY MASS INDEX: 23.74 KG/M2 | HEIGHT: 62 IN | TEMPERATURE: 97.6 F | WEIGHT: 129 LBS | DIASTOLIC BLOOD PRESSURE: 70 MMHG

## 2024-09-23 DIAGNOSIS — F33.41 RECURRENT MAJOR DEPRESSIVE DISORDER, IN PARTIAL REMISSION (CMS-HCC): ICD-10-CM

## 2024-09-23 DIAGNOSIS — E55.9 VITAMIN D DEFICIENCY: Primary | ICD-10-CM

## 2024-09-23 DIAGNOSIS — J30.1 ALLERGIC RHINITIS DUE TO POLLEN, UNSPECIFIED SEASONALITY: ICD-10-CM

## 2024-09-23 PROCEDURE — 1036F TOBACCO NON-USER: CPT | Performed by: FAMILY MEDICINE

## 2024-09-23 PROCEDURE — 3008F BODY MASS INDEX DOCD: CPT | Performed by: FAMILY MEDICINE

## 2024-09-23 PROCEDURE — 99213 OFFICE O/P EST LOW 20 MIN: CPT | Performed by: FAMILY MEDICINE

## 2024-09-23 RX ORDER — SERTRALINE HYDROCHLORIDE 100 MG/1
100 TABLET, FILM COATED ORAL DAILY
Qty: 90 TABLET | Refills: 1 | Status: SHIPPED | OUTPATIENT
Start: 2024-09-23

## 2024-09-23 NOTE — PROGRESS NOTES
Neena Aviles is a 44 y.o. female here today for   Chief Complaint   Patient presents with    Depression        HPI     Mood disorder recheck -- Patient feels like condition is well controlled.  Has good control of mood and emotional reactions.  No SE's or problems with medications.  No homicidal or suicidal ideation.  Patient wishes to continue same medications.  She sees counselor every 2 weeks virtually.  Two teens at home and a little stressful.  No breakthrough depression or anxiety.  Sleeping well at night.       Vitamin D deficiency -- Patient reports no muscle weakness or pain.  Taking Vitamin D as instructed.   Takes 5000 U daily with calcium.    IVETH - Patient reports that this condition has been stable.  There has not been any worsening or change in symptoms.  Patient feels it is well controlled and would like to continue current therapies.            Current Outpatient Medications:     calcium carbonate (CALCIUM 500 ORAL), Take by mouth., Disp: , Rfl:     cholecalciferol (Vitamin D-3) 5,000 Units tablet, Take 1 tablet (5,000 Units) by mouth once daily., Disp: 90 tablet, Rfl: 3    dapsone (Aczone) 5 % gel, APPLY TWICE A DAY TO FACE FOR ACNE, Disp: 90 g, Rfl: 7    loratadine (Claritin) 10 mg tablet, Take by mouth., Disp: , Rfl:     multivitamin-min-iron-FA-vit K (One Daily Women's) 18 mg iron-400 mcg-25 mcg tablet, Take 1 tablet by mouth once daily., Disp: , Rfl:     sertraline (Zoloft) 100 mg tablet, Take 1 tablet (100 mg) by mouth once daily., Disp: 90 tablet, Rfl: 1    Patient Active Problem List   Diagnosis    Abnormal ultrasound of pelvis    Allergic rhinitis    Asymptomatic bunion    Recurrent major depressive disorder, in partial remission (CMS-[facility])    Stress fracture of foot    Superficial acne vulgaris    Vitamin D deficiency    Encounter for preventive health examination    History of hysterectomy    Major depressive disorder         No results found for this or any previous visit (from the  "past 672 hour(s)).     Objective    Visit Vitals    Visit Vitals  /70   Pulse 79   Temp 36.4 °C (97.6 °F)   Resp 16   Ht 1.575 m (5' 2\")   Wt 58.5 kg (129 lb)   BMI 23.59 kg/m²   Smoking Status Never   BSA 1.6 m²       Body mass index is 23.59 kg/m².     Physical Exam     General - Not in acute distress and cooperative.  Build & Nutrition - Well developed  Posture - Normal  Gait - Normal  Mental Status - alert and oriented x 3    Head - Normocephalic    Neck - Thyroid normal size    Eyes - Bilateral - Sclera clear and lids pink without edema or mass.      Skin - Warm and dry with no rashes on visible skin    Lungs - Clear to auscultation and normal breathing effort    Cardiovascular - RRR and no murmurs, rubs or thrill.    Peripheral Vascular - Bilateral - no edema present    Neuropsychiatric - normal mood and affect      Assessment    1. Vitamin D deficiency     Condition seems well controlled.  No change in current treatment.       2. Recurrent major depressive disorder, in partial remission (CMS-[facility])  sertraline (Zoloft) 100 mg tablet   Condition well controlled.  No change in current treatment regimen.  Refill given of current medication.  Make a follow up appointment with me for recheck in 6 months.       3. Allergic rhinitis due to pollen, unspecified seasonality     Condition seems well controlled.  No change in current treatment.             Orders Placed This Encounter      sertraline (Zoloft) 100 mg tablet       No orders of the defined types were placed in this encounter.       New Medications Ordered This Visit   Medications    calcium carbonate (CALCIUM 500 ORAL)     Sig: Take by mouth.    sertraline (Zoloft) 100 mg tablet     Sig: Take 1 tablet (100 mg) by mouth once daily.     Dispense:  90 tablet     Refill:  1          "

## 2025-02-28 ENCOUNTER — APPOINTMENT (OUTPATIENT)
Dept: PRIMARY CARE | Facility: CLINIC | Age: 45
End: 2025-02-28
Payer: COMMERCIAL

## 2025-02-28 VITALS
DIASTOLIC BLOOD PRESSURE: 68 MMHG | WEIGHT: 127 LBS | HEART RATE: 72 BPM | HEIGHT: 62 IN | RESPIRATION RATE: 16 BRPM | SYSTOLIC BLOOD PRESSURE: 114 MMHG | TEMPERATURE: 97 F | BODY MASS INDEX: 23.37 KG/M2

## 2025-02-28 DIAGNOSIS — B07.8 COMMON WART: Primary | ICD-10-CM

## 2025-02-28 PROCEDURE — 1036F TOBACCO NON-USER: CPT | Performed by: FAMILY MEDICINE

## 2025-02-28 PROCEDURE — 3008F BODY MASS INDEX DOCD: CPT | Performed by: FAMILY MEDICINE

## 2025-02-28 PROCEDURE — 17110 DESTRUCTION B9 LES UP TO 14: CPT | Performed by: FAMILY MEDICINE

## 2025-02-28 NOTE — PROGRESS NOTES
"Neena Aviles is a 44 y.o. female here today for   Chief Complaint   Patient presents with    Wart     Wart on L hand and R foot         HPI     She has 2 warts.  One on the left fifth finger near the interspace.  And the right third toe on the plantar aspect.  They have been present for over 6 months and she has tried over-the-counter medications without success.  She is here for liquid nitrogen application.    Current Outpatient Medications:     calcium carbonate (CALCIUM 500 ORAL), Take by mouth., Disp: , Rfl:     cholecalciferol (Vitamin D-3) 5,000 Units tablet, Take 1 tablet (5,000 Units) by mouth once daily., Disp: 90 tablet, Rfl: 3    dapsone (Aczone) 5 % gel, APPLY TWICE A DAY TO FACE FOR ACNE, Disp: 90 g, Rfl: 7    loratadine (Claritin) 10 mg tablet, Take by mouth., Disp: , Rfl:     multivitamin-min-iron-FA-vit K (One Daily Women's) 18 mg iron-400 mcg-25 mcg tablet, Take 1 tablet by mouth once daily., Disp: , Rfl:     sertraline (Zoloft) 100 mg tablet, Take 1 tablet (100 mg) by mouth once daily., Disp: 90 tablet, Rfl: 1    Patient Active Problem List   Diagnosis    Abnormal ultrasound of pelvis    Allergic rhinitis    Asymptomatic bunion    Recurrent major depressive disorder, in partial remission (CMS-[facility])    Stress fracture of foot    Superficial acne vulgaris    Vitamin D deficiency    Encounter for preventive health examination    History of hysterectomy    Major depressive disorder         Objective    Visit Vitals    Visit Vitals  /68   Pulse 72   Temp 36.1 °C (97 °F)   Resp 16   Ht 1.575 m (5' 2\")   Wt 57.6 kg (127 lb)   BMI 23.23 kg/m²   Smoking Status Never   BSA 1.59 m²       Body mass index is 23.23 kg/m².     Physical Exam         Assessment & Plan  Common wart  I treated 2 warts as described above.  One of the left hand and one of the right foot.  All warts were pared down with a scalpel to remove dead tissue.  Then cryospray used on each wart for 20 seconds Freeze-thaw-Freeze with " a 1 mm rim.  Patient tolerated the procedure well.  No complications.  We will follow-up for retreatment in 3 weeks.                  No orders of the defined types were placed in this encounter.       No orders of the defined types were placed in this encounter.

## 2025-03-17 ENCOUNTER — APPOINTMENT (OUTPATIENT)
Dept: PRIMARY CARE | Facility: CLINIC | Age: 45
End: 2025-03-17
Payer: COMMERCIAL

## 2025-03-17 VITALS
RESPIRATION RATE: 16 BRPM | HEIGHT: 62 IN | SYSTOLIC BLOOD PRESSURE: 116 MMHG | TEMPERATURE: 97.1 F | DIASTOLIC BLOOD PRESSURE: 70 MMHG | WEIGHT: 132 LBS | HEART RATE: 91 BPM | BODY MASS INDEX: 24.29 KG/M2

## 2025-03-17 DIAGNOSIS — B07.8 COMMON WART: Primary | ICD-10-CM

## 2025-03-17 PROCEDURE — 3008F BODY MASS INDEX DOCD: CPT | Performed by: FAMILY MEDICINE

## 2025-03-17 PROCEDURE — 17110 DESTRUCTION B9 LES UP TO 14: CPT | Performed by: FAMILY MEDICINE

## 2025-03-17 NOTE — PROGRESS NOTES
"Neena Aviles is a 45 y.o. female here today for   Chief Complaint   Patient presents with    Wart     [place]airs         HPI     Retreating wart of left fifth finger and right third toe.  We treated her with liquid nitrogen spray on 2/28/2025.    Current Outpatient Medications:     calcium carbonate (CALCIUM 500 ORAL), Take by mouth., Disp: , Rfl:     cholecalciferol (Vitamin D-3) 5,000 Units tablet, Take 1 tablet (5,000 Units) by mouth once daily., Disp: 90 tablet, Rfl: 3    dapsone (Aczone) 5 % gel, APPLY TWICE A DAY TO FACE FOR ACNE, Disp: 90 g, Rfl: 7    loratadine (Claritin) 10 mg tablet, Take by mouth., Disp: , Rfl:     multivitamin-min-iron-FA-vit K (One Daily Women's) 18 mg iron-400 mcg-25 mcg tablet, Take 1 tablet by mouth once daily., Disp: , Rfl:     sertraline (Zoloft) 100 mg tablet, Take 1 tablet (100 mg) by mouth once daily., Disp: 90 tablet, Rfl: 1    Patient Active Problem List   Diagnosis    Abnormal ultrasound of pelvis    Allergic rhinitis    Asymptomatic bunion    Recurrent major depressive disorder, in partial remission (CMS-[facility])    Stress fracture of foot    Superficial acne vulgaris    Vitamin D deficiency    Encounter for preventive health examination    History of hysterectomy    Major depressive disorder         Objective    Visit Vitals    Visit Vitals  /70   Pulse 91   Temp 36.2 °C (97.1 °F)   Resp 16   Ht 1.575 m (5' 2\")   Wt 59.9 kg (132 lb)   BMI 24.14 kg/m²   Smoking Status Never   BSA 1.62 m²       Body mass index is 24.14 kg/m².     Physical Exam         Assessment & Plan  Common wart  We retreated 2 warts as previous.  All warts were pared down with a scalpel to remove dead tissue.  Then cryospray used on each wart for 20 seconds Freeze-thaw-Freeze with a 1 mm rim.  Patient tolerated the procedure well.  No complications.  We will follow-up in 3 to 4 weeks for recheck.                  No orders of the defined types were placed in this encounter.       No orders of the " defined types were placed in this encounter.

## 2025-03-18 ENCOUNTER — OFFICE VISIT (OUTPATIENT)
Dept: URGENT CARE | Age: 45
End: 2025-03-18
Payer: COMMERCIAL

## 2025-03-18 VITALS
HEIGHT: 62 IN | BODY MASS INDEX: 23.37 KG/M2 | WEIGHT: 127 LBS | TEMPERATURE: 97.8 F | HEART RATE: 84 BPM | RESPIRATION RATE: 20 BRPM | SYSTOLIC BLOOD PRESSURE: 92 MMHG | OXYGEN SATURATION: 98 % | DIASTOLIC BLOOD PRESSURE: 60 MMHG

## 2025-03-18 DIAGNOSIS — B34.8 RHINOVIRUS: Primary | ICD-10-CM

## 2025-03-18 DIAGNOSIS — J02.9 SORE THROAT: ICD-10-CM

## 2025-03-18 LAB
POC HUMAN RHINOVIRUS PCR: POSITIVE
POC INFLUENZA A VIRUS PCR: NEGATIVE
POC INFLUENZA B VIRUS PCR: NEGATIVE
POC RESPIRATORY SYNCYTIAL VIRUS PCR: NEGATIVE
POC STREPTOCOCCUS PYOGENES (GROUP A STREP) PCR: NEGATIVE

## 2025-03-18 PROCEDURE — 3008F BODY MASS INDEX DOCD: CPT | Performed by: PHYSICIAN ASSISTANT

## 2025-03-18 PROCEDURE — 1036F TOBACCO NON-USER: CPT | Performed by: PHYSICIAN ASSISTANT

## 2025-03-18 PROCEDURE — 87651 STREP A DNA AMP PROBE: CPT | Performed by: PHYSICIAN ASSISTANT

## 2025-03-18 PROCEDURE — 87631 RESP VIRUS 3-5 TARGETS: CPT | Performed by: PHYSICIAN ASSISTANT

## 2025-03-18 PROCEDURE — 99203 OFFICE O/P NEW LOW 30 MIN: CPT | Performed by: PHYSICIAN ASSISTANT

## 2025-03-18 NOTE — PROGRESS NOTES
"Subjective   Patient ID: Neena Aviles is a 45 y.o. female. They present today with a chief complaint of Headache (Headaches, bodyaches, sore throat, nasal congestion x 1 day ).    CC: URI symptoms    HPI: Patient presenting for URI symptoms 2 days.  Symptoms include runny nose, congestion, and cough.      No chest pain, shortness of breath, abdominal pain, nausea, vomiting.  No fever, chills, myalgias.  No history of clots or clotting disorders.  No lower extremity swelling or pain.    Past Medical History  Allergies as of 03/18/2025 - Reviewed 03/18/2025   Allergen Reaction Noted    Grass pollen Itching and Runny nose 01/01/2000    Weed pollen Itching and Runny nose 01/01/2000    Tetracyclines Itching and Rash 01/27/2023       (Not in a hospital admission)       Past Medical History:   Diagnosis Date    Acute conjunctivitis, left eye 10/25/2023    Viral syndrome 10/25/2023       Past Surgical History:   Procedure Laterality Date    OTHER SURGICAL HISTORY  07/30/2019    Endometrial ablation    OTHER SURGICAL HISTORY  07/30/2019    Corneal lasik        reports that she has never smoked. She has never used smokeless tobacco.    Review of Systems  Review of Systems    After reviewing all body systems I have documented pertinent findings above in the history.  All other Systems reviewed and are negative for complaint.  Pertinent positive and negatives are listed in the above HPI.    Objective    Vitals:    03/18/25 1000   BP: 92/60   BP Location: Left arm   Patient Position: Sitting   BP Cuff Size: Adult   Pulse: 84   Resp: 20   Temp: 36.6 °C (97.8 °F)   TempSrc: Temporal   SpO2: 98%   Weight: 57.6 kg (127 lb)   Height: 1.575 m (5' 2\")     No LMP recorded.  Physical Exam    General: Alert, oriented, and cooperative.  No acute distress. Well developed, well nourished.     Skin: Skin is warm, and dry. No rashes or lesions.    Eyes: Sclera and conjunctivae normal     Ears: TM´s are intact, grey, with mild effusions " bilaterally.  No significant erythema.  No hemotympanum or rupture. Bilateral auricle, helix, and tragus without inflammation or erythema.  No mastoid erythema, or tenderness.  No deformities. Right and left canal negative for erythema, or discharge.  Hearing is grossly intact bilaterally    Mouth/Throat: Pharynx is erythematous.  Tonsils are equal in size.  No exudates.  No angioedema of the lips or tongue.  No respiratory compromise, tripoding, drooling, muffled voice,  stridor, or trismus.     Neck: Supple.     Cardiac: Regular rate     Respiratory:  No acute respiratory distress.  Regular rate of breathing.  No accessory muscle use.  No tripoding.  Lungs are clear bilaterally.    Abdomen: Soft, nontender.    Musculoskeletal: Upper and lower extremities are atraumatic in appearance without deformity, erythema, edema, and atrophy. No crepitus, or tenderness. Compartments are all soft.      Procedures    Point of Care Test & Imaging Results from this visit  Results for orders placed or performed in visit on 03/18/25   POCT SPOTFIRE R/ST Panel Mini w/Strep A ([facility]) manually resulted    Collection Time: 03/18/25 10:25 AM   Result Value Ref Range    POC Group A Strep, PCR Negative Negative    POC Respiratory Syncytial Virus PCR Negative Negative    POC Influenza A Virus PCR Negative Negative    POC Influenza B Virus PCR Negative Negative    POC Human Rhinovirus PCR Positive (A) Negative     No results found.    Diagnostic study results (if any) were reviewed by Celestine Cisneros PA-C.    Assessment/Plan   Allergies, medications, history, and pertinent labs/EKGs/Imaging reviewed by Celestine Cisneros PA-C.     MDM:  Patient presenting for URI type symptoms x 2 day. Patient does not appear toxic. No acute distress or respiratory compromise.  No tripoding. No nasal flaring.  Speaks in complete sentences.  No sinus tenderness, oral lesions, drooling, stridor, or angioedema. Neck is supple without meningeal signs.  Lungs clear.  No reported chest tightness or purulent sputum production.  No clinical signs or history to suggest meningitis, Bola´s, peritonsillar abscess, epiglottitis, pneumonia, or asthma.  Given symptom onset/length of illness, a viral etiology is considered the most likely cause. Through shared decision making antibiotics are not warranted, and were not prescribed. Advised over the counter medication with good follow up. Pt discharged home d/t good condition.  Pt/family instructed to return if symptoms worsen or if new symptoms develop. Patient/family expressed understanding and consented to the above plan. No barriers of communication were apparent.    [place]: Positive for rhinovirus    Orders and Diagnoses  Diagnoses and all orders for this visit:  Rhinovirus  Sore throat  -     POCT SPOTFIRE R/ST Panel Mini w/Strep A ([facility]) manually resulted      Medical Admin Record      Patient disposition: Home    Electronically signed by Celestine Cisneros PA-C  10:27 AM

## 2025-03-24 ENCOUNTER — APPOINTMENT (OUTPATIENT)
Dept: PRIMARY CARE | Facility: CLINIC | Age: 45
End: 2025-03-24
Payer: COMMERCIAL

## 2025-03-24 DIAGNOSIS — F33.41 RECURRENT MAJOR DEPRESSIVE DISORDER, IN PARTIAL REMISSION (CMS-HCC): ICD-10-CM

## 2025-03-24 RX ORDER — SERTRALINE HYDROCHLORIDE 100 MG/1
100 TABLET, FILM COATED ORAL DAILY
Qty: 90 TABLET | Refills: 1 | Status: SHIPPED | OUTPATIENT
Start: 2025-03-24

## 2025-03-24 ASSESSMENT — PROMIS GLOBAL HEALTH SCALE
RATE_MENTAL_HEALTH: VERY GOOD
RATE_AVERAGE_FATIGUE: MILD
EMOTIONAL_PROBLEMS: RARELY
CARRYOUT_SOCIAL_ACTIVITIES: EXCELLENT
RATE_GENERAL_HEALTH: EXCELLENT
RATE_SOCIAL_SATISFACTION: EXCELLENT
RATE_QUALITY_OF_LIFE: EXCELLENT
RATE_PHYSICAL_HEALTH: VERY GOOD
CARRYOUT_PHYSICAL_ACTIVITIES: COMPLETELY
RATE_AVERAGE_PAIN: 0

## 2025-03-25 ENCOUNTER — APPOINTMENT (OUTPATIENT)
Dept: PRIMARY CARE | Facility: CLINIC | Age: 45
End: 2025-03-25
Payer: COMMERCIAL

## 2025-03-25 VITALS
HEIGHT: 62 IN | BODY MASS INDEX: 24.11 KG/M2 | RESPIRATION RATE: 18 BRPM | HEART RATE: 82 BPM | DIASTOLIC BLOOD PRESSURE: 68 MMHG | TEMPERATURE: 97.8 F | WEIGHT: 131 LBS | SYSTOLIC BLOOD PRESSURE: 118 MMHG

## 2025-03-25 DIAGNOSIS — E55.9 VITAMIN D DEFICIENCY: ICD-10-CM

## 2025-03-25 DIAGNOSIS — F33.41 RECURRENT MAJOR DEPRESSIVE DISORDER, IN PARTIAL REMISSION (CMS-HCC): ICD-10-CM

## 2025-03-25 DIAGNOSIS — Z00.00 ENCOUNTER FOR PREVENTIVE HEALTH EXAMINATION: Primary | ICD-10-CM

## 2025-03-25 PROCEDURE — 99396 PREV VISIT EST AGE 40-64: CPT | Performed by: FAMILY MEDICINE

## 2025-03-25 PROCEDURE — 1036F TOBACCO NON-USER: CPT | Performed by: FAMILY MEDICINE

## 2025-03-25 PROCEDURE — 3008F BODY MASS INDEX DOCD: CPT | Performed by: FAMILY MEDICINE

## 2025-03-25 NOTE — PROGRESS NOTES
Neena Aviles is a 45 y.o. female here today a periodic health exam.  I reviewed previous preventative health measures including screening tests, immunizations and labs.      HPI   CURRENT COMPLAINTS OR CONCERNS:    Recent viral URI.  Covid and flu negative at .  Now with mild congestion only.  Other sxs resolved.      Mood disorder recheck -- Patient feels like condition is well controlled.  Has good control of mood and emotional reactions.  No SE's or problems with medications.  No homicidal or suicidal ideation.  Patient wishes to continue same medications.   Sees her counselor monthly.  Takes sertraline 100 mg daily.  She increased to 150 mg over the winter and it helped.  Now back to 100 mg daily.        Negative BRCA testing at [facility] recently for FH of cancers.      PREVIOUS PREVENTATIVE HEALTH    Mammogram :  YES -- DATE 5/2024  Pap Test : YES -- DATE 2024 with her gynecologist .  Not needed because partial hysterectomy.    Hepatitis C Antibody : Yes 3/18/2024  HIV Screening : Yes 3/18/2024  Tdap : Yes 9/18/2017  Yearly Flu Shot : Yes    CURRENT FINDINGS  Healthy Diet : Yes  Exercise : Yes - regularly  Depression or Anxiety Issues : Yes but well-controlled with medication  Alcohol Use : Rarely  Tobacco Use : No  Drug Use : No      Past Medical History  Patient Active Problem List   Diagnosis    Abnormal ultrasound of pelvis    Allergic rhinitis    Asymptomatic bunion    Recurrent major depressive disorder, in partial remission (CMS-[facility])    Stress fracture of foot    Superficial acne vulgaris    Vitamin D deficiency    Encounter for preventive health examination    History of hysterectomy    Major depressive disorder       Past Surgical History:   Procedure Laterality Date    OTHER SURGICAL HISTORY  07/30/2019    Endometrial ablation    OTHER SURGICAL HISTORY  07/30/2019    Corneal lasik        Current Outpatient Medications:     calcium carbonate (CALCIUM 500 ORAL), Take by mouth., Disp: , Rfl:      cholecalciferol (Vitamin D-3) 5,000 Units tablet, Take 1 tablet (5,000 Units) by mouth once daily., Disp: 90 tablet, Rfl: 3    dapsone (Aczone) 5 % gel, APPLY TWICE A DAY TO FACE FOR ACNE, Disp: 90 g, Rfl: 7    loratadine (Claritin) 10 mg tablet, Take by mouth., Disp: , Rfl:     multivitamin-min-iron-FA-vit K (One Daily Women's) 18 mg iron-400 mcg-25 mcg tablet, Take 1 tablet by mouth once daily., Disp: , Rfl:     sertraline (Zoloft) 100 mg tablet, TAKE 1 TABLET DAILY, Disp: 90 tablet, Rfl: 1   Immunization History   Administered Date(s) Administered    Flu vaccine (IIV4), preservative free *Check age/dose* 12/22/2022    Flu vaccine, quadrivalent, no egg protein, age 6 month or greater (FLUCELVAX) 10/12/2023    Flu vaccine, trivalent, preservative free, age 6 months and greater (Fluarix/Fluzone/Flulaval) 10/12/2024    Influenza, seasonal, injectable 09/03/2021    Pfizer COVID-19 vaccine, 12 years and older, (30mcg/0.3mL) (Comirnaty) 10/12/2023, 10/12/2024    Pfizer COVID-19 vaccine, bivalent, age 12 years and older (30 mcg/0.3 mL) 12/22/2022    Pfizer Purple Cap SARS-CoV-2 03/27/2021, 04/17/2021, 11/04/2021    Tdap vaccine, age 7 year and older (BOOSTRIX, ADACEL) 09/18/2017        Social History  Social History     Socioeconomic History    Marital status:      Spouse name: Not on file    Number of children: Not on file    Years of education: Not on file    Highest education level: Not on file   Occupational History    Not on file   Tobacco Use    Smoking status: Never    Smokeless tobacco: Never   Vaping Use    Vaping status: Never Used   Substance and Sexual Activity    Alcohol use: Not on file    Drug use: Not on file    Sexual activity: Not on file   Other Topics Concern    Not on file   Social History Narrative    Not on file     Social Drivers of Health     Financial Resource Strain: Not on file   Food Insecurity: Not on file   Transportation Needs: Not on file   Physical Activity: Not on file   Stress:  "Not on file   Social Connections: Not on file   Intimate Partner Violence: Not on file   Housing Stability: Not on file        has no history on file for alcohol use.    reports that she has never smoked. She has never used smokeless tobacco.    has no history on file for drug use.           Allergies  Grass pollen, Weed pollen, and Tetracyclines      Physical Exam  Visit Vitals  /68   Pulse 82   Temp 36.6 °C (97.8 °F)   Resp 18   Ht 1.575 m (5' 2\")   Wt 59.4 kg (131 lb)   BMI 23.96 kg/m²   Smoking Status Never   BSA 1.61 m²     Body mass index is 23.96 kg/m².    Physical Exam  Vitals and nursing note reviewed.   Constitutional:       General: She is not in acute distress.     Appearance: Normal appearance.   HENT:      Head: Normocephalic and atraumatic.      Right Ear: Tympanic membrane, ear canal and external ear normal.      Left Ear: Tympanic membrane, ear canal and external ear normal.      Nose: Nose normal.      Mouth/Throat:      Mouth: Mucous membranes are moist.      Pharynx: Oropharynx is clear.   Eyes:      Extraocular Movements: Extraocular movements intact.      Conjunctiva/sclera: Conjunctivae normal.      Pupils: Pupils are equal, round, and reactive to light.   Cardiovascular:      Rate and Rhythm: Normal rate and regular rhythm.      Pulses: Normal pulses.      Heart sounds: Normal heart sounds. No murmur heard.     No friction rub. No gallop.   Pulmonary:      Effort: Pulmonary effort is normal. No respiratory distress.      Breath sounds: Normal breath sounds.   Abdominal:      General: Abdomen is flat. Bowel sounds are normal. There is no distension.      Palpations: Abdomen is soft.      Tenderness: There is no abdominal tenderness.   Musculoskeletal:         General: Normal range of motion.      Cervical back: Normal range of motion and neck supple.   Lymphadenopathy:      Cervical: No cervical adenopathy.   Skin:     General: Skin is warm and dry.      Findings: No lesion or rash. "   Neurological:      General: No focal deficit present.      Mental Status: She is alert. Mental status is at baseline.   Psychiatric:         Mood and Affect: Mood normal.         Behavior: Behavior normal.         Thought Content: Thought content normal.         Judgment: Judgment normal.                 Assessment      Assessment & Plan  Encounter for preventive health examination  Recommend regular exercise, balanced diet, regular dental exams, and healthy habits.  Appropriate labs ordered or reviewed.  I recommend to eat plenty of plant foods (such as whole-grain products, fruits, and vegetables) and a moderate amount of lean and low-fat, animal-based food (meat and dairy products).  When shopping, choose lean meats, fish, and poultry. I recommend to increase aerobic exercise.  I recommend a yearly flu shot in the fall and I recommend a yearly wellness exam.      She does see her gynecologist yearly for female health exams and mammograms.    Orders:    Comprehensive Metabolic Panel; Future    CBC; Future    Lipid Panel; Future    Vitamin D 25-Hydroxy,Total (for eval of Vitamin D levels); Future    TSH with reflex to Free T4 if abnormal; Future    Recurrent major depressive disorder, in partial remission (CMS-HCC)  Condition seems well controlled.  No change in current treatment.  Patient reports no refills are needed today.  I told her it is reasonable to increase her sertraline up to 150 mg in the fall and continue it through the winter and then decrease it back to 100 mg around March.       Vitamin D deficiency  Appropriate labs ordered or reviewed.  Orders:    Vitamin D 25-Hydroxy,Total (for eval of Vitamin D levels); Future          Anticipatory Guidance     Eat well: Eat a balanced diet that includes lots of fruits and vegetables, whole grains, nuts, seeds, and legumes. Limit processed foods, sugar, saturated fat, and salt. Aim to eat at least five servings of fruits and vegetables per day, and no more  than 1 teaspoon of salt.    Exercise: Try to exercise at least 30 minutes most days of the week.    Sleep: Aim to get 7-9 hours of sleep each night. Establish a bedtime routine and create a sleep-friendly environment.    Stay hydrated: Drink water and limit sugary beverages.    Reduce sitting time: Be mindful of your screen time.    Keep company with good people:  Set limits and boundaries.  Be selective.    Manage stress: Take breaks from the news, talk with someone you trust.    Other tips: Avoid drugs, tobacco and vaping.  Maintain a healthy weight, get regular health checkups, and limit alcohol          Orders Placed This Encounter   Procedures    Comprehensive Metabolic Panel    CBC    Lipid Panel    Vitamin D 25-Hydroxy,Total (for eval of Vitamin D levels)    TSH with reflex to Free T4 if abnormal        No orders of the defined types were placed in this encounter.

## 2025-03-25 NOTE — ASSESSMENT & PLAN NOTE
Recommend regular exercise, balanced diet, regular dental exams, and healthy habits.  Appropriate labs ordered or reviewed.  I recommend to eat plenty of plant foods (such as whole-grain products, fruits, and vegetables) and a moderate amount of lean and low-fat, animal-based food (meat and dairy products).  When shopping, choose lean meats, fish, and poultry. I recommend to increase aerobic exercise.  I recommend a yearly flu shot in the fall and I recommend a yearly wellness exam.      She does see her gynecologist yearly for female health exams and mammograms.    Orders:    Comprehensive Metabolic Panel; Future    CBC; Future    Lipid Panel; Future    Vitamin D 25-Hydroxy,Total (for eval of Vitamin D levels); Future    TSH with reflex to Free T4 if abnormal; Future

## 2025-03-25 NOTE — ASSESSMENT & PLAN NOTE
Condition seems well controlled.  No change in current treatment.  Patient reports no refills are needed today.  I told her it is reasonable to increase her sertraline up to 150 mg in the fall and continue it through the winter and then decrease it back to 100 mg around March.

## 2025-03-26 LAB
25(OH)D3+25(OH)D2 SERPL-MCNC: 48 NG/ML (ref 30–100)
ALBUMIN SERPL-MCNC: 4.1 G/DL (ref 3.6–5.1)
ALP SERPL-CCNC: 54 U/L (ref 31–125)
ALT SERPL-CCNC: 21 U/L (ref 6–29)
ANION GAP SERPL CALCULATED.4IONS-SCNC: 7 MMOL/L (CALC) (ref 7–17)
AST SERPL-CCNC: 16 U/L (ref 10–30)
BILIRUB SERPL-MCNC: 0.4 MG/DL (ref 0.2–1.2)
BUN SERPL-MCNC: 9 MG/DL (ref 7–25)
CALCIUM SERPL-MCNC: 9.1 MG/DL (ref 8.6–10.2)
CHLORIDE SERPL-SCNC: 108 MMOL/L (ref 98–110)
CHOLEST SERPL-MCNC: 149 MG/DL
CHOLEST/HDLC SERPL: 3 (CALC)
CO2 SERPL-SCNC: 27 MMOL/L (ref 20–32)
CREAT SERPL-MCNC: 0.7 MG/DL (ref 0.5–0.99)
EGFRCR SERPLBLD CKD-EPI 2021: 109 ML/MIN/1.73M2
ERYTHROCYTE [DISTWIDTH] IN BLOOD BY AUTOMATED COUNT: 14 % (ref 11–15)
GLUCOSE SERPL-MCNC: 97 MG/DL (ref 65–99)
HCT VFR BLD AUTO: 42.1 % (ref 35–45)
HDLC SERPL-MCNC: 50 MG/DL
HGB BLD-MCNC: 13.7 G/DL (ref 11.7–15.5)
LDLC SERPL CALC-MCNC: 83 MG/DL (CALC)
MCH RBC QN AUTO: 28.1 PG (ref 27–33)
MCHC RBC AUTO-ENTMCNC: 32.5 G/DL (ref 32–36)
MCV RBC AUTO: 86.4 FL (ref 80–100)
NONHDLC SERPL-MCNC: 99 MG/DL (CALC)
PLATELET # BLD AUTO: 168 THOUSAND/UL (ref 140–400)
PMV BLD REES-ECKER: 10.8 FL (ref 7.5–12.5)
POTASSIUM SERPL-SCNC: 4.4 MMOL/L (ref 3.5–5.3)
PROT SERPL-MCNC: 6.6 G/DL (ref 6.1–8.1)
RBC # BLD AUTO: 4.87 MILLION/UL (ref 3.8–5.1)
SODIUM SERPL-SCNC: 142 MMOL/L (ref 135–146)
TRIGL SERPL-MCNC: 78 MG/DL
TSH SERPL-ACNC: 1.11 MIU/L
WBC # BLD AUTO: 6 THOUSAND/UL (ref 3.8–10.8)

## 2025-04-08 ENCOUNTER — APPOINTMENT (OUTPATIENT)
Dept: PRIMARY CARE | Facility: CLINIC | Age: 45
End: 2025-04-08
Payer: COMMERCIAL

## 2025-04-08 VITALS
WEIGHT: 130 LBS | HEART RATE: 94 BPM | SYSTOLIC BLOOD PRESSURE: 108 MMHG | TEMPERATURE: 97.1 F | DIASTOLIC BLOOD PRESSURE: 68 MMHG | BODY MASS INDEX: 23.92 KG/M2 | RESPIRATION RATE: 18 BRPM | HEIGHT: 62 IN

## 2025-04-08 DIAGNOSIS — B07.8 COMMON WART: Primary | ICD-10-CM

## 2025-04-08 PROCEDURE — 17110 DESTRUCTION B9 LES UP TO 14: CPT | Performed by: FAMILY MEDICINE

## 2025-04-08 PROCEDURE — 3008F BODY MASS INDEX DOCD: CPT | Performed by: FAMILY MEDICINE

## 2025-04-08 PROCEDURE — 1036F TOBACCO NON-USER: CPT | Performed by: FAMILY MEDICINE

## 2025-04-08 NOTE — PROGRESS NOTES
"Neena Aviles is a 45 y.o. female here today for   Chief Complaint   Patient presents with    Wart        HPI     Retreating wart of left fifth finger and right third toe. We treated her with liquid nitrogen spray on 3/17/2025.  The warts continue to slowly decrease.      Current Outpatient Medications:     calcium carbonate (CALCIUM 500 ORAL), Take by mouth., Disp: , Rfl:     cholecalciferol (Vitamin D-3) 5,000 Units tablet, Take 1 tablet (5,000 Units) by mouth once daily., Disp: 90 tablet, Rfl: 3    dapsone (Aczone) 5 % gel, APPLY TWICE A DAY TO FACE FOR ACNE, Disp: 90 g, Rfl: 7    loratadine (Claritin) 10 mg tablet, Take by mouth., Disp: , Rfl:     multivitamin-min-iron-FA-vit K (One Daily Women's) 18 mg iron-400 mcg-25 mcg tablet, Take 1 tablet by mouth once daily., Disp: , Rfl:     sertraline (Zoloft) 100 mg tablet, TAKE 1 TABLET DAILY, Disp: 90 tablet, Rfl: 1    Patient Active Problem List   Diagnosis    Abnormal ultrasound of pelvis    Allergic rhinitis    Asymptomatic bunion    Recurrent major depressive disorder, in partial remission (CMS-[facility])    Stress fracture of foot    Superficial acne vulgaris    Vitamin D deficiency    Encounter for preventive health examination    History of hysterectomy    Major depressive disorder         Objective    Visit Vitals    Visit Vitals  /68   Pulse 94   Temp 36.2 °C (97.1 °F)   Resp 18   Ht 1.575 m (5' 2\")   Wt 59 kg (130 lb)   BMI 23.78 kg/m²   Smoking Status Never   BSA 1.61 m²       Body mass index is 23.78 kg/m².     Physical Exam         Assessment & Plan  Common wart  I treated 1 wart on her hand and 1 on her foot.  All warts were pared down with a scalpel to remove dead tissue.  Then cryospray used on each wart for 20 seconds Freeze-thaw-Freeze with a 1 mm rim.  Patient tolerated the procedure well.  No complications.  I recommend that in 1 week she start using over-the-counter liquid Compound W daily to each wart and we will see her back in 3 weeks " for another cryotherapy.                  No orders of the defined types were placed in this encounter.       No orders of the defined types were placed in this encounter.

## 2025-04-24 ENCOUNTER — OFFICE VISIT (OUTPATIENT)
Dept: URGENT CARE | Age: 45
End: 2025-04-24
Payer: COMMERCIAL

## 2025-04-24 VITALS
TEMPERATURE: 97.9 F | WEIGHT: 130 LBS | DIASTOLIC BLOOD PRESSURE: 68 MMHG | BODY MASS INDEX: 23.92 KG/M2 | HEART RATE: 83 BPM | OXYGEN SATURATION: 99 % | HEIGHT: 62 IN | SYSTOLIC BLOOD PRESSURE: 99 MMHG

## 2025-04-24 DIAGNOSIS — J01.01 ACUTE RECURRENT MAXILLARY SINUSITIS: ICD-10-CM

## 2025-04-24 DIAGNOSIS — J01.90: Primary | ICD-10-CM

## 2025-04-24 DIAGNOSIS — B97.4: Primary | ICD-10-CM

## 2025-04-24 LAB
POC CORONAVIRUS SARS-COV-2 PCR: NEGATIVE
POC HUMAN RHINOVIRUS PCR: NEGATIVE
POC INFLUENZA A VIRUS PCR: NEGATIVE
POC INFLUENZA B VIRUS PCR: NEGATIVE
POC RESPIRATORY SYNCYTIAL VIRUS PCR: POSITIVE

## 2025-04-24 RX ORDER — BROMPHENIRAMINE MALEATE, PSEUDOEPHEDRINE HYDROCHLORIDE, AND DEXTROMETHORPHAN HYDROBROMIDE 2; 30; 10 MG/5ML; MG/5ML; MG/5ML
10 SYRUP ORAL 4 TIMES DAILY PRN
Qty: 200 ML | Refills: 0 | Status: SHIPPED | OUTPATIENT
Start: 2025-04-24 | End: 2025-05-04

## 2025-04-24 RX ORDER — AMOXICILLIN AND CLAVULANATE POTASSIUM 875; 125 MG/1; MG/1
875 TABLET, FILM COATED ORAL 2 TIMES DAILY
Qty: 20 TABLET | Refills: 0 | Status: SHIPPED | OUTPATIENT
Start: 2025-04-24

## 2025-04-24 RX ORDER — PREDNISONE 20 MG/1
40 TABLET ORAL DAILY
Qty: 10 TABLET | Refills: 0 | Status: SHIPPED | OUTPATIENT
Start: 2025-04-24 | End: 2025-04-29

## 2025-04-24 RX ORDER — PREDNISONE 20 MG/1
20 TABLET ORAL DAILY
Qty: 5 TABLET | Refills: 0 | Status: SHIPPED | OUTPATIENT
Start: 2025-04-24 | End: 2025-04-24

## 2025-04-24 RX ORDER — CETIRIZINE HYDROCHLORIDE 10 MG/1
10 TABLET ORAL DAILY
Qty: 30 TABLET | Refills: 0 | Status: SHIPPED | OUTPATIENT
Start: 2025-04-24

## 2025-04-24 ASSESSMENT — ENCOUNTER SYMPTOMS
VOMITING: 0
FEVER: 0
SORE THROAT: 1
FATIGUE: 1
COUGH: 1
ABDOMINAL PAIN: 0
GASTROINTESTINAL NEGATIVE: 1
HEADACHES: 1
CARDIOVASCULAR NEGATIVE: 1
NAUSEA: 0
SINUS PRESSURE: 1
DIARRHEA: 0
CHILLS: 1
RHINORRHEA: 1

## 2025-04-24 NOTE — PROGRESS NOTES
"Subjective   Patient ID: Neena Aviles is a 45 y.o. female. They present today with a chief complaint of URI (Several days, lots of nasal drainage, congestion left ear sore, HA off and on. Has been using otc meds no relief. No fevers known fevers. 4/20 sx started. ).    History of Present Illness  Subjective  History was provided by the patient.  Neena Aviles is a 45 y.o. female who presents for evaluation of symptoms of a URI. Symptoms include chills, dry cough, headache, post nasal drip, and sinus and nasal congestion. Onset of symptoms was 4 days ago, waxing and waning since that time. Associated symptoms include no  fever. She is drinking moderate amounts of fluids. Evaluation to date: none. Treatment to date: decongestants    Objective  BP 99/68   Pulse 83   Temp 36.6 °C (97.9 °F)   Ht 1.575 m (5' 2\")   Wt 59 kg (130 lb)   SpO2 99%   BMI 23.78 kg/m²   [unfilled]     Assessment/Plan  sinusitis and viral upper respiratory illness    Discussed diagnosis and treatment of URI.  Discussed the diagnosis and treatment of sinusitis.  Suggested symptomatic OTC remedies.  Nasal saline spray for congestion.  Augmentin per orders.  Follow up as needed.        History provided by:  Patient and medical records  URI  Presenting symptoms: congestion, cough, fatigue, rhinorrhea and sore throat    Presenting symptoms: no ear pain and no fever    Associated symptoms: headaches        Past Medical History  Allergies as of 04/24/2025 - Reviewed 04/24/2025   Allergen Reaction Noted    Grass pollen Itching and Runny nose 01/01/2000    Weed pollen Itching and Runny nose 01/01/2000    Tetracyclines Itching and Rash 01/27/2023       Prescriptions Prior to Admission[1]     Medical History[2]    Surgical History[3]     reports that she has never smoked. She has never used smokeless tobacco.    Review of Systems  Review of Systems   Constitutional:  Positive for chills and fatigue. Negative for fever.   HENT:  Positive " "for congestion, rhinorrhea, sinus pressure and sore throat. Negative for ear pain.    Respiratory:  Positive for cough.    Cardiovascular: Negative.    Gastrointestinal: Negative.  Negative for abdominal pain, diarrhea, nausea and vomiting.   Genitourinary: Negative.    Neurological:  Positive for headaches.   All other systems reviewed and are negative.                                 Objective    Vitals:    04/24/25 0906   BP: 99/68   Pulse: 83   Temp: 36.6 °C (97.9 °F)   SpO2: 99%   Weight: 59 kg (130 lb)   Height: 1.575 m (5' 2\")     No LMP recorded.    Physical Exam  Vitals and nursing note reviewed.   Constitutional:       General: She is not in acute distress.     Appearance: Normal appearance. She is ill-appearing.   HENT:      Head: Normocephalic and atraumatic.      Right Ear: A middle ear effusion is present.      Left Ear: A middle ear effusion is present.      Nose: Mucosal edema, congestion and rhinorrhea present. Rhinorrhea is clear and purulent.      Right Turbinates: Swollen.      Left Turbinates: Swollen.      Mouth/Throat:      Lips: Pink.      Mouth: Mucous membranes are moist.      Pharynx: Uvula midline. Posterior oropharyngeal erythema and postnasal drip present.   Cardiovascular:      Rate and Rhythm: Normal rate and regular rhythm.      Pulses: Normal pulses.      Heart sounds: Normal heart sounds.   Pulmonary:      Effort: Pulmonary effort is normal.      Breath sounds: Normal breath sounds and air entry. No decreased breath sounds or wheezing.   Musculoskeletal:      Cervical back: Normal range of motion and neck supple.   Skin:     General: Skin is warm and dry.      Capillary Refill: Capillary refill takes less than 2 seconds.   Neurological:      Mental Status: She is alert and oriented to person, place, and time.   Psychiatric:         Behavior: Behavior normal.         Procedures    Point of Care Test & Imaging Results from this visit  Results for orders placed or performed in visit " on 04/24/25   POCT SPOTFIRE R/ST Panel Mini w/COVID ([employer]) manually resulted    Specimen: Swab   Result Value Ref Range    POC Sars-Cov-2 PCR Negative Negative    POC Respiratory Syncytial Virus PCR Positive (A) Negative    POC Influenza A Virus PCR Negative Negative    POC Influenza B Virus PCR Negative Negative    POC Human Rhinovirus PCR Negative Negative      Imaging  No results found.    Cardiology, Vascular, and Other Imaging  No other imaging results found for the past 2 days      Diagnostic study results (if any) were reviewed by BERENICE Trujillo.    Assessment/Plan   Allergies, medications, history, and pertinent labs/EKGs/Imaging reviewed by BERENICE Trujillo.     Medical Decision Making  Risks, benefits, and alternatives of the medications and treatment plan prescribed today were discussed, and patient expressed understanding. Plan follow up as discussed or as needed if any worsening symptoms or change in condition. Reinforced red flags including (but not limited to): severe or worsening pain; difficulty swallowing; stiff neck; shortness of breath; coughing or vomiting blood; chest pain; and new or increased fever are indications to go to the Emergency Department.  At time of discharge patient was clinically well-appearing and HDS for outpatient management. The patient and/or family was educated regarding diagnosis, supportive care, OTC and Rx medications. The patient and/or family was given the opportunity to ask questions prior to discharge.  They verbalized understanding of my discussion of the plans for treatment, expected course, indications to return to  or seek further evaluation in ED, and the need for timely follow up as directed.   They were provided with a work/school excuse if requested. The after-visit summary was given to the patient and care instructions were reviewed with the patient. All questions were answered and the patient verbalized understanding of the  plan of care for today.  Plan:  Recent visit notes reviewed  Meds as above  Increase clear fluids  Pcp follow up this week if not improving or worsening  ER visit anytime 24/7 for acute worsening or changing condition      Orders and Diagnoses  Diagnoses and all orders for this visit:  Acute paranasal sinusitis due to respiratory syncytial virus (RSV)  Acute recurrent maxillary sinusitis  -     POCT SPOTFIRE R/ST Panel Mini w/COVID ([facility]) manually resulted  -     brompheniramine-pseudoeph-DM 2-30-10 mg/5 mL syrup; Take 10 mL by mouth 4 times a day as needed for cough or congestion for up to 10 days.  -     amoxicillin-clavulanate (Augmentin) 875-125 mg tablet; Take 1 tablet (875 mg) by mouth 2 times a day.  -     cetirizine (ZyrTEC) 10 mg tablet; Take 1 tablet (10 mg) by mouth once daily.  -     predniSONE (Deltasone) 20 mg tablet; Take 2 tablets (40 mg) by mouth once daily for 5 days.      Medical Admin Record      Patient disposition: Home    Electronically signed by BERENICE Trujillo  9:37 AM           [1] (Not in a hospital admission)   [2]   Past Medical History:  Diagnosis Date    Acute conjunctivitis, left eye 10/25/2023    Viral syndrome 10/25/2023   [3]   Past Surgical History:  Procedure Laterality Date    OTHER SURGICAL HISTORY  07/30/2019    Endometrial ablation    OTHER SURGICAL HISTORY  07/30/2019    Corneal lasik

## 2025-04-29 ENCOUNTER — APPOINTMENT (OUTPATIENT)
Dept: PRIMARY CARE | Facility: CLINIC | Age: 45
End: 2025-04-29
Payer: COMMERCIAL

## 2025-05-09 ENCOUNTER — APPOINTMENT (OUTPATIENT)
Dept: PRIMARY CARE | Facility: CLINIC | Age: 45
End: 2025-05-09
Payer: COMMERCIAL

## 2025-05-09 VITALS
SYSTOLIC BLOOD PRESSURE: 118 MMHG | TEMPERATURE: 97.7 F | BODY MASS INDEX: 23.05 KG/M2 | RESPIRATION RATE: 16 BRPM | DIASTOLIC BLOOD PRESSURE: 80 MMHG | WEIGHT: 126 LBS | HEART RATE: 82 BPM

## 2025-05-09 DIAGNOSIS — B07.8 COMMON WART: Primary | ICD-10-CM

## 2025-05-09 PROCEDURE — 1036F TOBACCO NON-USER: CPT | Performed by: FAMILY MEDICINE

## 2025-05-09 PROCEDURE — 17110 DESTRUCTION B9 LES UP TO 14: CPT | Performed by: FAMILY MEDICINE

## 2025-05-09 NOTE — PROGRESS NOTES
Neena Aviles is a 45 y.o. female here today for   Chief Complaint   Patient presents with    Wart     [place]         HPI   Retreating wart of left fifth finger and right third toe. We treated her with liquid nitrogen spray on 4/8/2025.  The warts continue to slowly decrease.     Current Medications[1]    Problem List[2]      Objective    Visit Vitals    Visit Vitals  /80   Pulse 82   Temp 36.5 °C (97.7 °F)   Resp 16   Wt 57.2 kg (126 lb)   BMI 23.05 kg/m²   Smoking Status Never   BSA 1.58 m²       Body mass index is 23.05 kg/m².     Physical Exam         Assessment & Plan  Common wart  All warts were pared down with a scalpel to remove dead tissue.  Then cryospray used on each wart for 20 seconds Freeze-thaw-Freeze with a 1 mm rim.  Patient tolerated the procedure well.  No complications.  The wart on her toe has cleared up so it was not treated today.  Follow-up as needed.                  No orders of the defined types were placed in this encounter.       No orders of the defined types were placed in this encounter.              [1]   Current Outpatient Medications:     calcium carbonate (CALCIUM 500 ORAL), Take by mouth., Disp: , Rfl:     cetirizine (ZyrTEC) 10 mg tablet, Take 1 tablet (10 mg) by mouth once daily., Disp: 30 tablet, Rfl: 0    cholecalciferol (Vitamin D-3) 5,000 Units tablet, Take 1 tablet (5,000 Units) by mouth once daily., Disp: 90 tablet, Rfl: 3    dapsone (Aczone) 5 % gel, APPLY TWICE A DAY TO FACE FOR ACNE, Disp: 90 g, Rfl: 7    loratadine (Claritin) 10 mg tablet, Take by mouth., Disp: , Rfl:     multivitamin-min-iron-FA-vit K (One Daily Women's) 18 mg iron-400 mcg-25 mcg tablet, Take 1 tablet by mouth once daily., Disp: , Rfl:     sertraline (Zoloft) 100 mg tablet, TAKE 1 TABLET DAILY, Disp: 90 tablet, Rfl: 1    amoxicillin-clavulanate (Augmentin) 875-125 mg tablet, Take 1 tablet (875 mg) by mouth 2 times a day., Disp: 20 tablet, Rfl: 0  [2]   Patient Active Problem  List  Diagnosis    Abnormal ultrasound of pelvis    Allergic rhinitis    Asymptomatic bunion    Recurrent major depressive disorder, in partial remission (CMS-[facility])    Stress fracture of foot    Superficial acne vulgaris    Vitamin D deficiency    Encounter for preventive health examination    History of hysterectomy    Major depressive disorder

## 2025-08-26 ENCOUNTER — APPOINTMENT (OUTPATIENT)
Dept: PRIMARY CARE | Facility: CLINIC | Age: 45
End: 2025-08-26
Payer: COMMERCIAL

## 2025-09-29 ENCOUNTER — APPOINTMENT (OUTPATIENT)
Dept: PRIMARY CARE | Facility: CLINIC | Age: 45
End: 2025-09-29
Payer: COMMERCIAL

## 2025-10-14 ENCOUNTER — APPOINTMENT (OUTPATIENT)
Dept: PRIMARY CARE | Facility: CLINIC | Age: 45
End: 2025-10-14
Payer: COMMERCIAL